# Patient Record
Sex: FEMALE | Race: ASIAN | NOT HISPANIC OR LATINO | URBAN - METROPOLITAN AREA
[De-identification: names, ages, dates, MRNs, and addresses within clinical notes are randomized per-mention and may not be internally consistent; named-entity substitution may affect disease eponyms.]

---

## 2019-07-19 ENCOUNTER — INPATIENT (INPATIENT)
Facility: HOSPITAL | Age: 64
LOS: 4 days | Discharge: HOME | End: 2019-07-24
Attending: HOSPITALIST | Admitting: HOSPITALIST
Payer: MEDICAID

## 2019-07-19 VITALS
DIASTOLIC BLOOD PRESSURE: 79 MMHG | OXYGEN SATURATION: 100 % | TEMPERATURE: 98 F | HEART RATE: 76 BPM | RESPIRATION RATE: 18 BRPM | SYSTOLIC BLOOD PRESSURE: 127 MMHG

## 2019-07-19 LAB
ALBUMIN SERPL ELPH-MCNC: 4.1 G/DL — SIGNIFICANT CHANGE UP (ref 3.3–5)
ALP SERPL-CCNC: 66 U/L — SIGNIFICANT CHANGE UP (ref 40–120)
ALT FLD-CCNC: 22 U/L — SIGNIFICANT CHANGE UP (ref 4–33)
ANION GAP SERPL CALC-SCNC: 14 MMO/L — SIGNIFICANT CHANGE UP (ref 7–14)
AST SERPL-CCNC: 40 U/L — HIGH (ref 4–32)
BASE EXCESS BLDV CALC-SCNC: -1.3 MMOL/L — SIGNIFICANT CHANGE UP
BASOPHILS # BLD AUTO: 0.03 K/UL — SIGNIFICANT CHANGE UP (ref 0–0.2)
BASOPHILS NFR BLD AUTO: 0.3 % — SIGNIFICANT CHANGE UP (ref 0–2)
BILIRUB SERPL-MCNC: 0.3 MG/DL — SIGNIFICANT CHANGE UP (ref 0.2–1.2)
BLOOD GAS VENOUS - CREATININE: 0.65 MG/DL — SIGNIFICANT CHANGE UP (ref 0.5–1.3)
BUN SERPL-MCNC: 6 MG/DL — LOW (ref 7–23)
CALCIUM SERPL-MCNC: 9.3 MG/DL — SIGNIFICANT CHANGE UP (ref 8.4–10.5)
CHLORIDE BLDV-SCNC: 100 MMOL/L — SIGNIFICANT CHANGE UP (ref 96–108)
CHLORIDE SERPL-SCNC: 94 MMOL/L — LOW (ref 98–107)
CO2 SERPL-SCNC: 19 MMOL/L — LOW (ref 22–31)
CREAT SERPL-MCNC: 0.56 MG/DL — SIGNIFICANT CHANGE UP (ref 0.5–1.3)
EOSINOPHIL # BLD AUTO: 0.06 K/UL — SIGNIFICANT CHANGE UP (ref 0–0.5)
EOSINOPHIL NFR BLD AUTO: 0.6 % — SIGNIFICANT CHANGE UP (ref 0–6)
GAS PNL BLDV: 121 MMOL/L — LOW (ref 136–146)
GLUCOSE BLDV-MCNC: 94 MG/DL — SIGNIFICANT CHANGE UP (ref 70–99)
GLUCOSE SERPL-MCNC: 93 MG/DL — SIGNIFICANT CHANGE UP (ref 70–99)
HCO3 BLDV-SCNC: 23 MMOL/L — SIGNIFICANT CHANGE UP (ref 20–27)
HCT VFR BLD CALC: 35.6 % — SIGNIFICANT CHANGE UP (ref 34.5–45)
HCT VFR BLDV CALC: 34.8 % — SIGNIFICANT CHANGE UP (ref 34.5–45)
HGB BLD-MCNC: 11.5 G/DL — SIGNIFICANT CHANGE UP (ref 11.5–15.5)
HGB BLDV-MCNC: 11.3 G/DL — LOW (ref 11.5–15.5)
IMM GRANULOCYTES NFR BLD AUTO: 0.5 % — SIGNIFICANT CHANGE UP (ref 0–1.5)
LACTATE BLDV-MCNC: 1.7 MMOL/L — SIGNIFICANT CHANGE UP (ref 0.5–2)
LYMPHOCYTES # BLD AUTO: 2.16 K/UL — SIGNIFICANT CHANGE UP (ref 1–3.3)
LYMPHOCYTES # BLD AUTO: 22 % — SIGNIFICANT CHANGE UP (ref 13–44)
MCHC RBC-ENTMCNC: 26.9 PG — LOW (ref 27–34)
MCHC RBC-ENTMCNC: 32.3 % — SIGNIFICANT CHANGE UP (ref 32–36)
MCV RBC AUTO: 83.4 FL — SIGNIFICANT CHANGE UP (ref 80–100)
MONOCYTES # BLD AUTO: 1.01 K/UL — HIGH (ref 0–0.9)
MONOCYTES NFR BLD AUTO: 10.3 % — SIGNIFICANT CHANGE UP (ref 2–14)
NEUTROPHILS # BLD AUTO: 6.5 K/UL — SIGNIFICANT CHANGE UP (ref 1.8–7.4)
NEUTROPHILS NFR BLD AUTO: 66.3 % — SIGNIFICANT CHANGE UP (ref 43–77)
NRBC # FLD: 0 K/UL — SIGNIFICANT CHANGE UP (ref 0–0)
PCO2 BLDV: 40 MMHG — LOW (ref 41–51)
PH BLDV: 7.38 PH — SIGNIFICANT CHANGE UP (ref 7.32–7.43)
PLATELET # BLD AUTO: 335 K/UL — SIGNIFICANT CHANGE UP (ref 150–400)
PMV BLD: 9.4 FL — SIGNIFICANT CHANGE UP (ref 7–13)
PO2 BLDV: 48 MMHG — HIGH (ref 35–40)
POTASSIUM BLDV-SCNC: 5.9 MMOL/L — HIGH (ref 3.4–4.5)
POTASSIUM SERPL-MCNC: 4.5 MMOL/L — SIGNIFICANT CHANGE UP (ref 3.5–5.3)
POTASSIUM SERPL-SCNC: 4.5 MMOL/L — SIGNIFICANT CHANGE UP (ref 3.5–5.3)
PROT SERPL-MCNC: 8.1 G/DL — SIGNIFICANT CHANGE UP (ref 6–8.3)
RBC # BLD: 4.27 M/UL — SIGNIFICANT CHANGE UP (ref 3.8–5.2)
RBC # FLD: 13.8 % — SIGNIFICANT CHANGE UP (ref 10.3–14.5)
SAO2 % BLDV: 79.5 % — SIGNIFICANT CHANGE UP (ref 60–85)
SODIUM SERPL-SCNC: 127 MMOL/L — LOW (ref 135–145)
TROPONIN T, HIGH SENSITIVITY: < 6 NG/L — SIGNIFICANT CHANGE UP (ref ?–14)
WBC # BLD: 9.81 K/UL — SIGNIFICANT CHANGE UP (ref 3.8–10.5)
WBC # FLD AUTO: 9.81 K/UL — SIGNIFICANT CHANGE UP (ref 3.8–10.5)

## 2019-07-19 PROCEDURE — 74177 CT ABD & PELVIS W/CONTRAST: CPT | Mod: 26

## 2019-07-19 PROCEDURE — 71046 X-RAY EXAM CHEST 2 VIEWS: CPT | Mod: 26

## 2019-07-19 PROCEDURE — 70450 CT HEAD/BRAIN W/O DYE: CPT | Mod: 26

## 2019-07-19 RX ORDER — MECLIZINE HCL 12.5 MG
25 TABLET ORAL ONCE
Refills: 0 | Status: COMPLETED | OUTPATIENT
Start: 2019-07-19 | End: 2019-07-19

## 2019-07-19 RX ORDER — MORPHINE SULFATE 50 MG/1
4 CAPSULE, EXTENDED RELEASE ORAL ONCE
Refills: 0 | Status: DISCONTINUED | OUTPATIENT
Start: 2019-07-19 | End: 2019-07-19

## 2019-07-19 RX ORDER — SODIUM CHLORIDE 9 MG/ML
1000 INJECTION INTRAMUSCULAR; INTRAVENOUS; SUBCUTANEOUS ONCE
Refills: 0 | Status: COMPLETED | OUTPATIENT
Start: 2019-07-19 | End: 2019-07-19

## 2019-07-19 RX ORDER — ONDANSETRON 8 MG/1
4 TABLET, FILM COATED ORAL ONCE
Refills: 0 | Status: COMPLETED | OUTPATIENT
Start: 2019-07-19 | End: 2019-07-19

## 2019-07-19 RX ADMIN — Medication 25 MILLIGRAM(S): at 20:39

## 2019-07-19 RX ADMIN — SODIUM CHLORIDE 1000 MILLILITER(S): 9 INJECTION INTRAMUSCULAR; INTRAVENOUS; SUBCUTANEOUS at 19:50

## 2019-07-19 NOTE — ED ADULT TRIAGE NOTE - CHIEF COMPLAINT QUOTE
Pt Vietnamese speaking,  ID# [id number].  Pt A+OX2.  Pt c/o abd bloating, gas, burning feeling, and discomfort.  Also c/o dizziness and always feeling cold.  Says she has a stomach ulcer.  Seen at [facility] for same 2 days ago.  Denies N/V/D or fever.  Not eating well due to gas.  Denies pain and feels like stomach is moving around.

## 2019-07-19 NOTE — ED ADULT NURSE NOTE - NSIMPLEMENTINTERV_GEN_ALL_ED
Implemented All Universal Safety Interventions:  [place] to call system. Call bell, personal items and telephone within reach. Instruct patient to call for assistance. Room bathroom lighting operational. Non-slip footwear when patient is off stretcher. Physically safe environment: no spills, clutter or unnecessary equipment. Stretcher in lowest position, wheels locked, appropriate side rails in place.

## 2019-07-19 NOTE — ED ADULT NURSE NOTE - OBJECTIVE STATEMENT
64 year old female, Indonesian speaking, daughter at bedside translating in addition to translation phone earlier. pt c/o abdominal discomfort x 1 month where pt feels bloated and "filled with gas", with gas pain radiating to chest. pt reports feeling cold, nauseas, dizzy, weak as well. pt reports constipation x 2 days. pt denies fevers, headache, numbness/tingling. pt reports being at [facility] for 2 days with no diagnosis. pt returned home and symptoms persisted. 20g iv insert to right ac. labs sent as ordered. NS infusing well.

## 2019-07-19 NOTE — ED PROVIDER NOTE - CARE PLAN
Principal Discharge DX:	Abdominal pain  Secondary Diagnosis:	Chest pain  Secondary Diagnosis:	Dizziness Principal Discharge DX:	Abdominal pain  Secondary Diagnosis:	Chest pain  Secondary Diagnosis:	Dizziness  Secondary Diagnosis:	Hyponatremia

## 2019-07-19 NOTE — ED PROVIDER NOTE - CLINICAL SUMMARY MEDICAL DECISION MAKING FREE TEXT BOX
63 Y/O Nepali speaking F ( [id number] used to speak to pt) PMH Hyponatremia, Peptic Ulcer, HTN, HLD presents with multiple complaints including CP, ABD PN and chronic Dizz. EKG normal. Plan is CT Head to R/O acute intracranial pathology and Cty Abdomen to R/O 65 Y/O Latvian speaking F ( [phone] used to speak to pt) PMH Hyponatremia, Peptic Ulcer, HTN, HLD presents with multiple complaints including CP, ABD PN and chronic Dizz. EKG normal. Plan is CT Head to R/O acute intracranial pathology and CT Abdomen to R/O obstruction or acute surgical pathology. Will X ray chest to R/O mass, cardiomegaly or focal consolidation. Morphine initially ordered but pt notes improvement of pain. EKG normal troponin sent. Labs ordered will eval for electrolyte disturbance.

## 2019-07-19 NOTE — ED PROVIDER NOTE - OBJECTIVE STATEMENT
65 Y/O Syriac speaking F ( [phone] used to speak to pt) PMH Hyponatremia, Peptic Ulcer, HTN, HLD presents with multiple complaints. Pt C/O ABD PN which feels like a burning/bloating and is very severe. 63 Y/O Divehi speaking F ( [phone] used to speak to pt) PMH Hyponatremia, Peptic Ulcer, HTN, HLD presents with multiple complaints. Pt C/O ABD PN which feels like a burning/bloating and is very severe. Pt admits to nausea, denies vomiting/diarrhea/fever/chills/nightsweats. Pt also states she feels a pressure in her chest. Pt states she is dizzy with a room spinning dizz that she has had for years. Pt was recently admitted at [facility] for hyponatremia and reports limited PO intake since discharged. Pt denies any other symptoms or complaints.

## 2019-07-19 NOTE — ED ADULT NURSE NOTE - CHIEF COMPLAINT QUOTE
Pt Setswana speaking,  ID# [id number].  Pt A+OX2.  Pt c/o abd bloating, gas, burning feeling, and discomfort.  Also c/o dizziness and always feeling cold.  Says she has a stomach ulcer.  Seen at [facility] for same 2 days ago.  Denies N/V/D or fever.  Not eating well due to gas.  Denies pain and feels like stomach is moving around.

## 2019-07-19 NOTE — ED PROVIDER NOTE - ATTENDING CONTRIBUTION TO CARE
Rosalie: I have seen and examined the patient face to face, have reviewed and addended the HPI, PE and a/p as necessary.    65 yo F English speaking with recent hyponatremia, peptic ulcer, HTN and HLD a/w abdominal pain burning severe in quality for the past few days and dizziness for years.  Dizziness described as vertigo for years.  According to daughter she has also had chest pressure in chest intermittent, last episode was today.  She is unable to define chest pressure.  Was recently in [facility] for hypoatremia, and since discharge has been having decreased PO intake and ?weight loss according to daughter.  No fever/chills, No photophobia/eye pain/changes in vision, No ear pain/sore throat/dysphagia, No chest pain/palpitations, no SOB/cough/wheeze/stridor, No abdominal pain, No N/V/D, no dysuria/frequency/discharge, No neck/back pain, no rash, no changes in neurological status/function.      GEN - NAD; well appearing; A+O x3; non-toxic appearing  MOUTH - Dry mucous membranes.    CARD -s1s2, RRR, no M,G,R;   PULM - CTA b/l, symmetric breath sounds;   ABD:  +BS, soft, diffusely tender to palpation, no guarding, no rebound, no masses;   BACK: no CVA tenderness, Normal  spine;   EXT: symmetric pulses, 2+ dp, capillary refill < 2 no clubbing, no cyanosis, no edema    EKG NSR 75 BPM, no st/t wave changes     65 yo F a/w dizziness, chest pain and recently hyponatremic, concern for dehydration and possible atypical chest pain.  Will check trops, cbc cmp, chest xray and ct abd pelvis to evaluate for chest pain acs and intraabdominal pathology.

## 2019-07-20 DIAGNOSIS — R10.9 UNSPECIFIED ABDOMINAL PAIN: ICD-10-CM

## 2019-07-20 DIAGNOSIS — E78.5 HYPERLIPIDEMIA, UNSPECIFIED: ICD-10-CM

## 2019-07-20 DIAGNOSIS — Z29.9 ENCOUNTER FOR PROPHYLACTIC MEASURES, UNSPECIFIED: ICD-10-CM

## 2019-07-20 DIAGNOSIS — R42 DIZZINESS AND GIDDINESS: ICD-10-CM

## 2019-07-20 DIAGNOSIS — R07.89 OTHER CHEST PAIN: ICD-10-CM

## 2019-07-20 DIAGNOSIS — D64.9 ANEMIA, UNSPECIFIED: ICD-10-CM

## 2019-07-20 DIAGNOSIS — I10 ESSENTIAL (PRIMARY) HYPERTENSION: ICD-10-CM

## 2019-07-20 DIAGNOSIS — E87.1 HYPO-OSMOLALITY AND HYPONATREMIA: ICD-10-CM

## 2019-07-20 DIAGNOSIS — E87.6 HYPOKALEMIA: ICD-10-CM

## 2019-07-20 DIAGNOSIS — R07.9 CHEST PAIN, UNSPECIFIED: ICD-10-CM

## 2019-07-20 LAB
ALBUMIN SERPL ELPH-MCNC: 3.8 G/DL — SIGNIFICANT CHANGE UP (ref 3.3–5)
ALP SERPL-CCNC: 67 U/L — SIGNIFICANT CHANGE UP (ref 40–120)
ALT FLD-CCNC: 18 U/L — SIGNIFICANT CHANGE UP (ref 4–33)
ANION GAP SERPL CALC-SCNC: 13 MMO/L — SIGNIFICANT CHANGE UP (ref 7–14)
APPEARANCE UR: CLEAR — SIGNIFICANT CHANGE UP
AST SERPL-CCNC: 17 U/L — SIGNIFICANT CHANGE UP (ref 4–32)
BASOPHILS # BLD AUTO: 0.03 K/UL — SIGNIFICANT CHANGE UP (ref 0–0.2)
BASOPHILS # BLD AUTO: 0.04 K/UL — SIGNIFICANT CHANGE UP (ref 0–0.2)
BASOPHILS NFR BLD AUTO: 0.3 % — SIGNIFICANT CHANGE UP (ref 0–2)
BASOPHILS NFR BLD AUTO: 0.5 % — SIGNIFICANT CHANGE UP (ref 0–2)
BILIRUB SERPL-MCNC: 0.4 MG/DL — SIGNIFICANT CHANGE UP (ref 0.2–1.2)
BILIRUB UR-MCNC: NEGATIVE — SIGNIFICANT CHANGE UP
BLOOD UR QL VISUAL: NEGATIVE — SIGNIFICANT CHANGE UP
BUN SERPL-MCNC: 6 MG/DL — LOW (ref 7–23)
CALCIUM SERPL-MCNC: 9.2 MG/DL — SIGNIFICANT CHANGE UP (ref 8.4–10.5)
CHLORIDE SERPL-SCNC: 98 MMOL/L — SIGNIFICANT CHANGE UP (ref 98–107)
CHLORIDE UR-SCNC: 83 MMOL/L — SIGNIFICANT CHANGE UP
CHOLEST SERPL-MCNC: 167 MG/DL — SIGNIFICANT CHANGE UP (ref 120–199)
CK MB BLD-MCNC: 2.65 NG/ML — SIGNIFICANT CHANGE UP (ref 1–4.7)
CK SERPL-CCNC: 50 U/L — SIGNIFICANT CHANGE UP (ref 25–170)
CO2 SERPL-SCNC: 19 MMOL/L — LOW (ref 22–31)
COLOR SPEC: COLORLESS — SIGNIFICANT CHANGE UP
CREAT ?TM UR-MCNC: 16.8 MG/DL — SIGNIFICANT CHANGE UP
CREAT SERPL-MCNC: 0.65 MG/DL — SIGNIFICANT CHANGE UP (ref 0.5–1.3)
EOSINOPHIL # BLD AUTO: 0.03 K/UL — SIGNIFICANT CHANGE UP (ref 0–0.5)
EOSINOPHIL # BLD AUTO: 0.09 K/UL — SIGNIFICANT CHANGE UP (ref 0–0.5)
EOSINOPHIL NFR BLD AUTO: 0.3 % — SIGNIFICANT CHANGE UP (ref 0–6)
EOSINOPHIL NFR BLD AUTO: 1 % — SIGNIFICANT CHANGE UP (ref 0–6)
GLUCOSE SERPL-MCNC: 98 MG/DL — SIGNIFICANT CHANGE UP (ref 70–99)
GLUCOSE UR-MCNC: NEGATIVE — SIGNIFICANT CHANGE UP
HBA1C BLD-MCNC: 5.7 % — HIGH (ref 4–5.6)
HCT VFR BLD CALC: 33.3 % — LOW (ref 34.5–45)
HCT VFR BLD CALC: 33.7 % — LOW (ref 34.5–45)
HDLC SERPL-MCNC: 71 MG/DL — HIGH (ref 45–65)
HGB BLD-MCNC: 10.7 G/DL — LOW (ref 11.5–15.5)
HGB BLD-MCNC: 11.1 G/DL — LOW (ref 11.5–15.5)
IMM GRANULOCYTES NFR BLD AUTO: 0.3 % — SIGNIFICANT CHANGE UP (ref 0–1.5)
IMM GRANULOCYTES NFR BLD AUTO: 0.5 % — SIGNIFICANT CHANGE UP (ref 0–1.5)
KETONES UR-MCNC: NEGATIVE — SIGNIFICANT CHANGE UP
LEUKOCYTE ESTERASE UR-ACNC: NEGATIVE — SIGNIFICANT CHANGE UP
LIPID PNL WITH DIRECT LDL SERPL: 77 MG/DL — SIGNIFICANT CHANGE UP
LYMPHOCYTES # BLD AUTO: 1.7 K/UL — SIGNIFICANT CHANGE UP (ref 1–3.3)
LYMPHOCYTES # BLD AUTO: 19.8 % — SIGNIFICANT CHANGE UP (ref 13–44)
LYMPHOCYTES # BLD AUTO: 2.73 K/UL — SIGNIFICANT CHANGE UP (ref 1–3.3)
LYMPHOCYTES # BLD AUTO: 28.9 % — SIGNIFICANT CHANGE UP (ref 13–44)
MAGNESIUM SERPL-MCNC: 1.9 MG/DL — SIGNIFICANT CHANGE UP (ref 1.6–2.6)
MCHC RBC-ENTMCNC: 26.4 PG — LOW (ref 27–34)
MCHC RBC-ENTMCNC: 27 PG — SIGNIFICANT CHANGE UP (ref 27–34)
MCHC RBC-ENTMCNC: 32.1 % — SIGNIFICANT CHANGE UP (ref 32–36)
MCHC RBC-ENTMCNC: 32.9 % — SIGNIFICANT CHANGE UP (ref 32–36)
MCV RBC AUTO: 82 FL — SIGNIFICANT CHANGE UP (ref 80–100)
MCV RBC AUTO: 82 FL — SIGNIFICANT CHANGE UP (ref 80–100)
MONOCYTES # BLD AUTO: 0.84 K/UL — SIGNIFICANT CHANGE UP (ref 0–0.9)
MONOCYTES # BLD AUTO: 0.87 K/UL — SIGNIFICANT CHANGE UP (ref 0–0.9)
MONOCYTES NFR BLD AUTO: 10.1 % — SIGNIFICANT CHANGE UP (ref 2–14)
MONOCYTES NFR BLD AUTO: 8.9 % — SIGNIFICANT CHANGE UP (ref 2–14)
NEUTROPHILS # BLD AUTO: 5.73 K/UL — SIGNIFICANT CHANGE UP (ref 1.8–7.4)
NEUTROPHILS # BLD AUTO: 5.91 K/UL — SIGNIFICANT CHANGE UP (ref 1.8–7.4)
NEUTROPHILS NFR BLD AUTO: 60.6 % — SIGNIFICANT CHANGE UP (ref 43–77)
NEUTROPHILS NFR BLD AUTO: 68.8 % — SIGNIFICANT CHANGE UP (ref 43–77)
NITRITE UR-MCNC: NEGATIVE — SIGNIFICANT CHANGE UP
NRBC # FLD: 0 K/UL — SIGNIFICANT CHANGE UP (ref 0–0)
NRBC # FLD: 0 K/UL — SIGNIFICANT CHANGE UP (ref 0–0)
PH UR: 7.5 — SIGNIFICANT CHANGE UP (ref 5–8)
PHOSPHATE SERPL-MCNC: 4 MG/DL — SIGNIFICANT CHANGE UP (ref 2.5–4.5)
PLATELET # BLD AUTO: 301 K/UL — SIGNIFICANT CHANGE UP (ref 150–400)
PLATELET # BLD AUTO: 307 K/UL — SIGNIFICANT CHANGE UP (ref 150–400)
PMV BLD: 8.8 FL — SIGNIFICANT CHANGE UP (ref 7–13)
PMV BLD: 9.1 FL — SIGNIFICANT CHANGE UP (ref 7–13)
POTASSIUM SERPL-MCNC: 3.4 MMOL/L — LOW (ref 3.5–5.3)
POTASSIUM SERPL-SCNC: 3.4 MMOL/L — LOW (ref 3.5–5.3)
POTASSIUM UR-SCNC: 10.1 MMOL/L — SIGNIFICANT CHANGE UP
PROT SERPL-MCNC: 7.4 G/DL — SIGNIFICANT CHANGE UP (ref 6–8.3)
PROT UR-MCNC: NEGATIVE — SIGNIFICANT CHANGE UP
RBC # BLD: 4.06 M/UL — SIGNIFICANT CHANGE UP (ref 3.8–5.2)
RBC # BLD: 4.11 M/UL — SIGNIFICANT CHANGE UP (ref 3.8–5.2)
RBC # FLD: 13.8 % — SIGNIFICANT CHANGE UP (ref 10.3–14.5)
RBC # FLD: 13.8 % — SIGNIFICANT CHANGE UP (ref 10.3–14.5)
SODIUM SERPL-SCNC: 130 MMOL/L — LOW (ref 135–145)
SODIUM UR-SCNC: 89 MMOL/L — SIGNIFICANT CHANGE UP
SP GR SPEC: 1.04 — SIGNIFICANT CHANGE UP (ref 1–1.04)
TRIGL SERPL-MCNC: 167 MG/DL — HIGH (ref 10–149)
TROPONIN T, HIGH SENSITIVITY: < 6 NG/L — SIGNIFICANT CHANGE UP (ref ?–14)
TSH SERPL-MCNC: 2 UIU/ML — SIGNIFICANT CHANGE UP (ref 0.27–4.2)
UROBILINOGEN FLD QL: NORMAL — SIGNIFICANT CHANGE UP
WBC # BLD: 8.59 K/UL — SIGNIFICANT CHANGE UP (ref 3.8–10.5)
WBC # BLD: 9.45 K/UL — SIGNIFICANT CHANGE UP (ref 3.8–10.5)
WBC # FLD AUTO: 8.59 K/UL — SIGNIFICANT CHANGE UP (ref 3.8–10.5)
WBC # FLD AUTO: 9.45 K/UL — SIGNIFICANT CHANGE UP (ref 3.8–10.5)

## 2019-07-20 PROCEDURE — 99222 1ST HOSP IP/OBS MODERATE 55: CPT

## 2019-07-20 PROCEDURE — 93306 TTE W/DOPPLER COMPLETE: CPT | Mod: 26

## 2019-07-20 PROCEDURE — 99223 1ST HOSP IP/OBS HIGH 75: CPT

## 2019-07-20 PROCEDURE — 12345: CPT | Mod: NC

## 2019-07-20 PROCEDURE — 93010 ELECTROCARDIOGRAM REPORT: CPT

## 2019-07-20 RX ORDER — DOCUSATE SODIUM 100 MG
100 CAPSULE ORAL THREE TIMES A DAY
Refills: 0 | Status: DISCONTINUED | OUTPATIENT
Start: 2019-07-20 | End: 2019-07-24

## 2019-07-20 RX ORDER — POLYETHYLENE GLYCOL 3350 17 G/17G
17 POWDER, FOR SOLUTION ORAL DAILY
Refills: 0 | Status: DISCONTINUED | OUTPATIENT
Start: 2019-07-20 | End: 2019-07-24

## 2019-07-20 RX ORDER — ONDANSETRON 8 MG/1
4 TABLET, FILM COATED ORAL EVERY 6 HOURS
Refills: 0 | Status: DISCONTINUED | OUTPATIENT
Start: 2019-07-20 | End: 2019-07-24

## 2019-07-20 RX ORDER — PANTOPRAZOLE SODIUM 20 MG/1
40 TABLET, DELAYED RELEASE ORAL DAILY
Refills: 0 | Status: DISCONTINUED | OUTPATIENT
Start: 2019-07-20 | End: 2019-07-24

## 2019-07-20 RX ORDER — ASPIRIN/CALCIUM CARB/MAGNESIUM 324 MG
81 TABLET ORAL DAILY
Refills: 0 | Status: DISCONTINUED | OUTPATIENT
Start: 2019-07-20 | End: 2019-07-24

## 2019-07-20 RX ORDER — FOLIC ACID 0.8 MG
1 TABLET ORAL DAILY
Refills: 0 | Status: DISCONTINUED | OUTPATIENT
Start: 2019-07-20 | End: 2019-07-24

## 2019-07-20 RX ORDER — NEBIVOLOL HYDROCHLORIDE 5 MG/1
2.5 TABLET ORAL DAILY
Refills: 0 | Status: DISCONTINUED | OUTPATIENT
Start: 2019-07-20 | End: 2019-07-24

## 2019-07-20 RX ORDER — HEPARIN SODIUM 5000 [USP'U]/ML
5000 INJECTION INTRAVENOUS; SUBCUTANEOUS EVERY 12 HOURS
Refills: 0 | Status: DISCONTINUED | OUTPATIENT
Start: 2019-07-20 | End: 2019-07-24

## 2019-07-20 RX ORDER — FENOFIBRATE,MICRONIZED 130 MG
145 CAPSULE ORAL DAILY
Refills: 0 | Status: DISCONTINUED | OUTPATIENT
Start: 2019-07-20 | End: 2019-07-24

## 2019-07-20 RX ORDER — HYOSCYAMINE SULFATE 0.13 MG
0.12 TABLET ORAL EVERY 4 HOURS
Refills: 0 | Status: DISCONTINUED | OUTPATIENT
Start: 2019-07-20 | End: 2019-07-24

## 2019-07-20 RX ORDER — POTASSIUM CHLORIDE 20 MEQ
40 PACKET (EA) ORAL ONCE
Refills: 0 | Status: COMPLETED | OUTPATIENT
Start: 2019-07-20 | End: 2019-07-20

## 2019-07-20 RX ORDER — SODIUM CHLORIDE 9 MG/ML
3 INJECTION INTRAMUSCULAR; INTRAVENOUS; SUBCUTANEOUS EVERY 8 HOURS
Refills: 0 | Status: DISCONTINUED | OUTPATIENT
Start: 2019-07-20 | End: 2019-07-24

## 2019-07-20 RX ORDER — SENNA PLUS 8.6 MG/1
2 TABLET ORAL AT BEDTIME
Refills: 0 | Status: DISCONTINUED | OUTPATIENT
Start: 2019-07-20 | End: 2019-07-24

## 2019-07-20 RX ORDER — ERGOCALCIFEROL 1.25 MG/1
1 CAPSULE ORAL
Qty: 0 | Refills: 0 | DISCHARGE

## 2019-07-20 RX ORDER — MECLIZINE HCL 12.5 MG
25 TABLET ORAL THREE TIMES A DAY
Refills: 0 | Status: DISCONTINUED | OUTPATIENT
Start: 2019-07-20 | End: 2019-07-24

## 2019-07-20 RX ORDER — ZOLPIDEM TARTRATE 10 MG/1
5 TABLET ORAL AT BEDTIME
Refills: 0 | Status: DISCONTINUED | OUTPATIENT
Start: 2019-07-20 | End: 2019-07-24

## 2019-07-20 RX ORDER — FAMOTIDINE 10 MG/ML
20 INJECTION INTRAVENOUS ONCE
Refills: 0 | Status: COMPLETED | OUTPATIENT
Start: 2019-07-20 | End: 2019-07-20

## 2019-07-20 RX ADMIN — ONDANSETRON 4 MILLIGRAM(S): 8 TABLET, FILM COATED ORAL at 13:54

## 2019-07-20 RX ADMIN — SODIUM CHLORIDE 3 MILLILITER(S): 9 INJECTION INTRAMUSCULAR; INTRAVENOUS; SUBCUTANEOUS at 21:38

## 2019-07-20 RX ADMIN — Medication 1 MILLIGRAM(S): at 12:09

## 2019-07-20 RX ADMIN — Medication 145 MILLIGRAM(S): at 12:09

## 2019-07-20 RX ADMIN — FAMOTIDINE 20 MILLIGRAM(S): 10 INJECTION INTRAVENOUS at 22:30

## 2019-07-20 RX ADMIN — PANTOPRAZOLE SODIUM 40 MILLIGRAM(S): 20 TABLET, DELAYED RELEASE ORAL at 07:06

## 2019-07-20 RX ADMIN — Medication 81 MILLIGRAM(S): at 12:09

## 2019-07-20 RX ADMIN — Medication 40 MILLIEQUIVALENT(S): at 10:26

## 2019-07-20 RX ADMIN — SODIUM CHLORIDE 3 MILLILITER(S): 9 INJECTION INTRAMUSCULAR; INTRAVENOUS; SUBCUTANEOUS at 13:54

## 2019-07-20 RX ADMIN — HEPARIN SODIUM 5000 UNIT(S): 5000 INJECTION INTRAVENOUS; SUBCUTANEOUS at 07:05

## 2019-07-20 RX ADMIN — Medication 100 MILLIGRAM(S): at 13:39

## 2019-07-20 RX ADMIN — NEBIVOLOL HYDROCHLORIDE 2.5 MILLIGRAM(S): 5 TABLET ORAL at 10:27

## 2019-07-20 RX ADMIN — SODIUM CHLORIDE 3 MILLILITER(S): 9 INJECTION INTRAMUSCULAR; INTRAVENOUS; SUBCUTANEOUS at 07:09

## 2019-07-20 RX ADMIN — SENNA PLUS 2 TABLET(S): 8.6 TABLET ORAL at 21:38

## 2019-07-20 RX ADMIN — Medication 100 MILLIGRAM(S): at 21:38

## 2019-07-20 RX ADMIN — HEPARIN SODIUM 5000 UNIT(S): 5000 INJECTION INTRAVENOUS; SUBCUTANEOUS at 18:16

## 2019-07-20 RX ADMIN — Medication 25 MILLIGRAM(S): at 22:35

## 2019-07-20 NOTE — CONSULT NOTE ADULT - ATTENDING COMMENTS
63 yo F HTN HL Hyponatremia presenting for second admit in 2 weeks for abdominal pain.  Negative labs and imaging.  has not had endoscopy.  Given post prandial epigastric discomfort with associated bloating, would proceed with endoscopy as next step.    Impression:  1) Post prandial abdominal pain  2) HTN/HL  3) Hyponatremia    Plan:  1) EGD on monday  2) PO PPI daily  3) Gas X PRN  4) Hyponatremia workup as per primary team  5) GI to follow

## 2019-07-20 NOTE — H&P ADULT - ASSESSMENT
63 y/o F with hx of HTN, HLD, hyponatremia presents with abdominal discomfort which describes as burning/bloating sensation with associated nausea x 1 week.

## 2019-07-20 NOTE — H&P ADULT - NSHPLABSRESULTS_GEN_ALL_CORE
EKG: NSR 73 TWI in AVR Flat T  in V1                          10.7   9.45  )-----------( 307      ( 20 Jul 2019 02:52 )             33.3     07-20    130<L>  |  98  |  6<L>  ----------------------------<  98  3.4<L>   |  19<L>  |  0.65    Ca    9.2      20 Jul 2019 02:52  Phos  4.0     07-20  Mg     1.9     07-20    TPro  7.4  /  Alb  3.8  /  TBili  0.4  /  DBili  x   /  AST  17  /  ALT  18  /  AlkPhos  67  07-20    Troponin T, High Sensitivity: < 6: ---------------------***PLEASE NOTE***----------------------  Rapid changes upward or downward in high-sensitivity  troponin levels strongly suggest acute myocardial injury.  Hemolysis may falsely lower results. Renal impairment may  increase results.    Normal: <6 - 14 ng/L  Indeterminate: 15 - 51 ng/L  Elevated: >51 ng/L    Please see "[email or web address]" on the [employer] for more information. ng/L (07.20.19 @ 02:52) EKG, 7/19, NSR 75bpm, qtc 439, no acute Tw or ST changes - my reading                           10.7   9.45  )-----------( 307      ( 20 Jul 2019 02:52 )             33.3     07-20    130<L>  |  98  |  6<L>  ----------------------------<  98  3.4<L>   |  19<L>  |  0.65    Ca    9.2      20 Jul 2019 02:52  Phos  4.0     07-20  Mg     1.9     07-20    TPro  7.4  /  Alb  3.8  /  TBili  0.4  /  DBili  x   /  AST  17  /  ALT  18  /  AlkPhos  67  07-20    Troponin T, High Sensitivity: < 6: ---------------------***PLEASE NOTE***----------------------  Rapid changes upward or downward in high-sensitivity  troponin levels strongly suggest acute myocardial injury.  Hemolysis may falsely lower results. Renal impairment may  increase results.    Normal: <6 - 14 ng/L  Indeterminate: 15 - 51 ng/L  Elevated: >51 ng/L    Please see "[email or web address] on the [employer]  intranet for more information. ng/L (07.20.19 @ 02:52) EKG, 7/19, NSR 75bpm, qtc 439, no acute Tw or ST changes - my reading                           10.7   9.45  )-----------( 307      ( 20 Jul 2019 02:52 )             33.3     07-20    130<L>  |  98  |  6<L>  ----------------------------<  98  3.4<L>   |  19<L>  |  0.65    Ca    9.2      20 Jul 2019 02:52  Phos  4.0     07-20  Mg     1.9     07-20    TPro  7.4  /  Alb  3.8  /  TBili  0.4  /  DBili  x   /  AST  17  /  ALT  18  /  AlkPhos  67  07-20      EXAM:  CT BRAIN    PROCEDURE DATE:  Jul 19 2019   COMPARISON: No prior  FINDINGS:  The CT examination demonstrates mild generalized volume loss as   manifested by the enlargement of the ventricles, cisternal spaces, and   cortical sulci throughout.   There is no acute intracranial hemorrhage, mass effect, midline shift or   extra axial collections.   The gray white differentiation appears grossly preserved without evidence   for an acute transcortical infarction.   The bony windows demonstrates no fractures.   The included paranasal sinuses and mastoid air cells are predominantly   clear.  IMPRESSION:   There is no acute intracranial hemorrhage, mass effect, midline shift or   extra axial collections.       CT ABDOMEN AND PELVIS IC    PROCEDURE DATE:  Jul 19 2019   FINDINGS:  LOWER CHEST: Within normal limits.  LIVER: Hepatic steatosis.  BILE DUCTS: Normal caliber.  GALLBLADDER: Within normal limits.  SPLEEN: Within normal limits.  PANCREAS: Within normal limits.  ADRENALS: Within normal limits.  KIDNEYS/URETERS: Symmetric enhancement. No hydronephrosis.   BLADDER: Within normal limits.  REPRODUCTIVE ORGANS: Uterus is within normal limits. There is a 1.5 x 1.1   cm right adnexal cyst.  BOWEL: No bowel obstruction. Appendix is normal.  PERITONEUM: No ascites.  VESSELS:  Within normal limits.  RETROPERITONEUM: No lymphadenopathy.    ABDOMINAL WALL: Small fat-containing umbilical hernia.  BONES: No acute osseous abnormality.  IMPRESSION:   The reason for the patient's abdominal pain is not elucidated on this   examination.

## 2019-07-20 NOTE — H&P ADULT - PROBLEM SELECTOR PLAN 1
Admit to tele  check cbc, bmp, a1c, flp, tsh, trend CE  Echo ordered   consider cards eval Admit to tele  check cbc, bmp, a1c, flp, tsh, trend CE  Echo ordered   consider cards eval  Discussed with Dr. Hooper Ddx includes peptic ulcer disease, H pylori infection, gastritis   CT abdomen: The reason for the patient's abdominal pain is not elucidated on this   examination.  GI consult in AM for possible EGD  H Pylori antigen and Ab studies  Empiric Protnix IV and Zofran PRN for nausea Ddx includes peptic ulcer disease, H pylori infection, gastritis   CT abdomen: not contributory   GI consult in AM for possible EGD  H Pylori antigen and Ab studies  Empiric Protnix IV and Zofran PRN for nausea

## 2019-07-20 NOTE — CONSULT NOTE ADULT - SUBJECTIVE AND OBJECTIVE BOX
Chief Complaint:  Patient is a 64y old  Female who presents with a chief complaint of Chest pressure (2019 11:21)      HPI:  63yo F history of HTN, HLD presents with abdominal bloating and excessive gaseous distention. Pt states that this has been happening for the past 6-7 years, worsened recently when her medications was switched (pt unable to tell examiner which medications were given). Pt reportedly had an EGD in [facility] that was negative a few years ago, and came to the [facility] to address this problem. Pt also endorses chest pressure, but denies nausea, vomiting, dysphagia, odynophagia, fever, chills, weight loss, dyspepsia, melena, change in bowel habits, constipation, diarrhea. Abdominal discomfort does not radiate elsewhere. Pt's last colonoscopy was 12 years ago, results unknown. Pt currently takes Ranitidine and antacid with no improvement in her symptoms. Pt currently hesitant to undergo endoscopic evaluation. Of note, patient presented [facility] x 1 weeks for similar complaints, but was discharged home.    CT here showed hepatic steatosis, but was otherwise wnl.       Allergies:  No Known Allergies      Home Medications:    Hospital Medications:  aspirin enteric coated 81 milliGRAM(s) Oral daily  docusate sodium 100 milliGRAM(s) Oral three times a day  fenofibrate Tablet 145 milliGRAM(s) Oral daily  folic acid 1 milliGRAM(s) Oral daily  heparin  Injectable 5000 Unit(s) SubCutaneous every 12 hours  meclizine 25 milliGRAM(s) Oral three times a day PRN  nebivolol 2.5 milliGRAM(s) Oral daily  ondansetron Injectable 4 milliGRAM(s) IV Push every 6 hours PRN  pantoprazole  Injectable 40 milliGRAM(s) IV Push daily  polyethylene glycol 3350 17 Gram(s) Oral daily  senna 2 Tablet(s) Oral at bedtime  sodium chloride 0.9% lock flush 3 milliLiter(s) IV Push every 8 hours  zolpidem 5 milliGRAM(s) Oral at bedtime PRN      PMHX/PSHX:  Hyponatremia  Hyperlipemia  Hypertension  No significant past surgical history      Family history:  No pertinent family history in first degree relatives      There is no family history of peptic ulcer disease, gastric cancer, colon polyps, colon cancer, celiac disease, biliary, hepatic, or pancreatic disease.  None of the female relatives have breast, uterine, or ovarian cancer.     Social History:     ROS:     General:  No wt loss, fevers, chills, night sweats, fatigue,   Eyes:  Good vision, no reported pain  ENT:  No sore throat, pain, runny nose, dysphagia  CV:  No pain, palpitations, hypo/hypertension  Resp:  No dyspnea, cough, tachypnea, wheezing  GI:  see HPI  :  No pain, bleeding, incontinence, nocturia  Muscle:  No pain, weakness  Neuro:  No weakness, tingling, memory problems  Psych:  No fatigue, insomnia, mood problems, depression  Endocrine:  No polyuria, polydipsia, cold/heat intolerance  Heme:  No petechiae, ecchymosis, easy bruisability  Skin:  No rash, tattoos, scars, edema      PHYSICAL EXAM:     GENERAL:  Appears stated age, well-groomed  HEENT:  NC/AT,  conjunctivae clear and pink  CHEST:  Full & symmetric excursion, no increased effort, breath sounds clear  HEART:  Regular rhythm, S1, S2  ABDOMEN:  Soft, non-tender, non-distended  EXTEREMITIES:  no cyanosis,clubbing or edema  SKIN:  No rash/erythema/ecchymoses  NEURO:  Alert, oriented, no asterixis    Vital Signs:  Vital Signs Last 24 Hrs  T(C): 36.8 (2019 10:25), Max: 36.9 (2019 01:15)  T(F): 98.2 (2019 10:25), Max: 98.4 (2019 01:15)  HR: 89 (2019 10:25) (75 - 89)  BP: 132/82 (2019 10:25) (127/79 - 133/86)  BP(mean): --  RR: 16 (2019 10:25) (16 - 18)  SpO2: 100% (2019 10:25) (100% - 100%)  Daily Height in cm: 152.4 (2019 01:55)    Daily     LABS:                        10.7   9.45  )-----------( 307      ( 2019 02:52 )             33.3     Mean Cell Volume: 82.0 fL (-19 @ 02:52)        130<L>  |  98  |  6<L>  ----------------------------<  98  3.4<L>   |  19<L>  |  0.65    Ca    9.2      2019 02:52  Phos  4.0       Mg     1.9         TPro  7.4  /  Alb  3.8  /  TBili  0.4  /  DBili  x   /  AST  17  /  ALT  18  /  AlkPhos  67  07-20    LIVER FUNCTIONS - ( 2019 02:52 )  Alb: 3.8 g/dL / Pro: 7.4 g/dL / ALK PHOS: 67 u/L / ALT: 18 u/L / AST: 17 u/L / GGT: x             Urinalysis Basic - ( 2019 02:37 )    Color: COLORLESS / Appearance: CLEAR / S.037 / pH: 7.5  Gluc: NEGATIVE / Ketone: NEGATIVE  / Bili: NEGATIVE / Urobili: NORMAL   Blood: NEGATIVE / Protein: NEGATIVE / Nitrite: NEGATIVE   Leuk Esterase: NEGATIVE / RBC: x / WBC x   Sq Epi: x / Non Sq Epi: x / Bacteria: x                              10.7   9.45  )-----------( 307      ( 2019 02:52 )             33.3                         11.5   9.81  )-----------( 335      ( 2019 19:50 )             35.6     Imaging:

## 2019-07-20 NOTE — H&P ADULT - PROBLEM SELECTOR PLAN 3
Monitor  patient with hx of hyponatremia Mild, asymptomatic; Likely due to SIADH due to prolonged abd discomfort with severe nausea; No evidence of dehydration on BUN: Scr ratio;   Protonix IV   Zofran IV  Monitor serum sodium and response to NS IV

## 2019-07-20 NOTE — PHYSICAL THERAPY INITIAL EVALUATION ADULT - GENERAL OBSERVATIONS, REHAB EVAL
Patient was received semi-supine in bed in NAD. [place]  Trevor #[id number] utilized for translation.

## 2019-07-20 NOTE — PROVIDER CONTACT NOTE (OTHER) - ASSESSMENT
pt denies pain but stated pressure present and pointed to sternum with radiation to L shoulder. pt also complaining of epigastric discomfort. abd rounded, nontender. VSS pt denies pain but stated pressure present and pointed to sternum with radiation to L shoulder. pt also complaining of epigastric discomfort. abd rounded, nontender. VSS.  Nilam [id number]

## 2019-07-20 NOTE — PHYSICAL THERAPY INITIAL EVALUATION ADULT - PERTINENT HX OF CURRENT PROBLEM, REHAB EVAL
Patient is a 64 year old female admitted to [facility] on 7/20 with abdominal discomfort which describes as burning/bloating sensation with associated nausea and no vomiting x 1 week. PMH: HTN, HLD, hyponatremia.

## 2019-07-20 NOTE — H&P ADULT - NSHPSOCIALHISTORY_GEN_ALL_CORE
Denies smoking, alcohol or drug use    Lives with grandchildren Denies smoking, alcohol or drug use  Lives with grandchildren

## 2019-07-20 NOTE — PHYSICAL THERAPY INITIAL EVALUATION ADULT - ADDITIONAL COMMENTS
Patient reports she lives with her daughter and family in an apartment, denies steps to negotiate. Patient reports "they do everything for me" regarding ADLs, and reports she ambulated independently prior to admission.     Patient was left semi-supine in bed as found, all lines/tubes intact and call terrell within reach, JENNIFER baker

## 2019-07-20 NOTE — H&P ADULT - PROBLEM SELECTOR PLAN 2
CT abdomen: The reason for the patient's abdominal pain is not elucidated on this   examination.  start protonix and maalox PRN CT abdomen: The reason for the patient's abdominal pain is not elucidated on this   examination.  GI consult in AM  HPylori antigen studies GI etiology such as esophagitis vs cardiac;  Admit to tele  check cbc, bmp, a1c, flp, tsh, trend CE  Echo ordered   Screening EKG - no acute finding  hsTrop < 6 x2, CK and CKMB wnl  No evidence of ACS  Consider stress test as outpt Due to: GI etiology such as esophagitis vs less likely cardiac;  Admit to tele  check cbc, bmp, a1c, flp, tsh, trend CE  Echo ordered   Screening EKG - no acute finding  hsTrop < 6 x2, CK and CKMB wnl  No evidence of ACS  Consider stress test as outpt

## 2019-07-20 NOTE — H&P ADULT - PROBLEM SELECTOR PLAN 6
cont bystolic Check orthostatics  Fall risk  echo ordered  cont meclizine CT head not contributory: There is no acute intracranial hemorrhage, mass effect, midline shift or extra axial collections.   Check orthostatics x3 checks  Fall risk  echo ordered  cont meclizine  Telemetry CT head not contributory: There is no acute intracranial hemorrhage, mass effect, midline shift or extra axial collections.   Check orthostatics x3 checks  Fall risk  echo ordered  cont meclizine  Telemetry  PT eval to assess gait stability, fall precautions for now

## 2019-07-20 NOTE — H&P ADULT - NSHPLANGTRANSLATORFT_GEN_A_CORE
Patient speaks a Kazakh dialect; [place]elt[place]. Patient's son in law at bedside also aided with .  ID#[id number]

## 2019-07-20 NOTE — H&P ADULT - NEUROLOGICAL DETAILS
responds to verbal commands/cranial nerves intact/alert and oriented x 3 cranial nerves intact/normal strength/alert and oriented x 3/responds to verbal commands

## 2019-07-20 NOTE — CONSULT NOTE ADULT - ASSESSMENT
Impression:  1) Abdominal discomfort- Differential diagnosis includes peptic ulcer disease, dyspepsia, gastroparesis, functional dyspepsia, SIBO, IBS, lactose/fructose intolerance, malabsorption. CT negative for gallbladder disease or pancreatitis.     Recommendations:  -Pt hesitant to undergo EGD, and wants to explore other medical option before endoscopic evaluation  -Start PPI PO qday  -Lactose free diet  -Trial of Levsin 0.125 q4 hours as needed  -Rest of care per primary team Impression:  1) Abdominal discomfort- Differential diagnosis includes peptic ulcer disease, dyspepsia, gastroparesis, functional dyspepsia, SIBO, IBS, lactose/fructose intolerance, malabsorption. CT negative for gallbladder disease or pancreatitis.     Recommendations:  -Pt hesitant to undergo EGD, and wants to explore other medical options  -Start PPI PO qday  -Lactose free diet  -Trial of Levsin 0.125 q4 hours as needed  -Rest of care per primary team

## 2019-07-20 NOTE — PHYSICAL THERAPY INITIAL EVALUATION ADULT - PATIENT PROFILE REVIEW, REHAB EVAL
yes/PT orders received:  . Consult with JENNIFER BENDER, pt may participate in PT evaluation. PT orders received: no formal activity order. Consult with RN Rema BENDER, pt may participate in PT evaluation and ambulate with PT./yes

## 2019-07-20 NOTE — PROGRESS NOTE ADULT - SUBJECTIVE AND OBJECTIVE BOX
Patient is a 64y old  Female who presents with a chief complaint of Chest pressure (2019 05:04)      SUBJECTIVE / OVERNIGHT EVENTS: pt seen and examined, chart reviewed. Pt interviewed with Croatian  [id number]. Pt feels better, no more abd pain, just feels bloated. No BM since yesterday. tolerated diet. No N/V.      MEDICATIONS  (STANDING):  aspirin enteric coated 81 milliGRAM(s) Oral daily  fenofibrate Tablet 145 milliGRAM(s) Oral daily  folic acid 1 milliGRAM(s) Oral daily  heparin  Injectable 5000 Unit(s) SubCutaneous every 12 hours  nebivolol 2.5 milliGRAM(s) Oral daily  pantoprazole  Injectable 40 milliGRAM(s) IV Push daily  sodium chloride 0.9% lock flush 3 milliLiter(s) IV Push every 8 hours    MEDICATIONS  (PRN):  meclizine 25 milliGRAM(s) Oral three times a day PRN Dizziness  ondansetron Injectable 4 milliGRAM(s) IV Push every 6 hours PRN Nausea and/or Vomiting  zolpidem 5 milliGRAM(s) Oral at bedtime PRN Insomnia      Vital Signs Last 24 Hrs  T(C): 36.8 (2019 10:25), Max: 36.9 (2019 01:15)  T(F): 98.2 (2019 10:25), Max: 98.4 (2019 01:15)  HR: 89 (2019 10:25) (75 - 89)  BP: 132/82 (2019 10:25) (127/79 - 133/86)  BP(mean): --  RR: 16 (2019 10:25) (16 - 18)  SpO2: 100% (2019 10:25) (100% - 100%)  CAPILLARY BLOOD GLUCOSE        I&O's Summary      PHYSICAL EXAM:  GENERAL: NAD, well-developed  HEAD:  Atraumatic, Normocephalic  EYES: EOMI, PERRLA, conjunctiva and sclera clear  NECK: Supple, No JVD  CHEST/LUNG: Clear to auscultation bilaterally; No wheeze  HEART: Regular rate and rhythm; No murmurs, rubs, or gallops  ABDOMEN: Soft, Nontender, Nondistended; Bowel sounds present  EXTREMITIES:  2+ Peripheral Pulses, No clubbing, cyanosis, or edema  PSYCH: AAOx3  NEUROLOGY: non-focal  SKIN: No rashes or lesions    LABS:                        10.7   9.45  )-----------( 307      ( 2019 02:52 )             33.3     07-20    130<L>  |  98  |  6<L>  ----------------------------<  98  3.4<L>   |  19<L>  |  0.65    Ca    9.2      2019 02:52  Phos  4.0     -  Mg     1.9     -    TPro  7.4  /  Alb  3.8  /  TBili  0.4  /  DBili  x   /  AST  17  /  ALT  18  /  AlkPhos  67  07-20      CARDIAC MARKERS ( 2019 02:52 )  x     / x     / 50 u/L / 2.65 ng/mL / x          Urinalysis Basic - ( 2019 02:37 )    Color: COLORLESS / Appearance: CLEAR / S.037 / pH: 7.5  Gluc: NEGATIVE / Ketone: NEGATIVE  / Bili: NEGATIVE / Urobili: NORMAL   Blood: NEGATIVE / Protein: NEGATIVE / Nitrite: NEGATIVE   Leuk Esterase: NEGATIVE / RBC: x / WBC x   Sq Epi: x / Non Sq Epi: x / Bacteria: x    < from: CT Abdomen and Pelvis w/ IV Cont (19 @ 22:41) >      IMPRESSION:     The reason for the patient's abdominal pain is not elucidated on this   examination.    < end of copied text >      RADIOLOGY & ADDITIONAL TESTS:    Imaging Personally Reviewed:    Consultant(s) Notes Reviewed:      Care Discussed with Consultants/Other Providers:

## 2019-07-20 NOTE — H&P ADULT - RS GEN PE MLT RESP DETAILS PC
respirations non-labored/airway patent/good air movement/breath sounds equal/clear to auscultation bilaterally no rales/respirations non-labored/good air movement/no rhonchi/airway patent/breath sounds equal/clear to auscultation bilaterally

## 2019-07-21 DIAGNOSIS — R41.0 DISORIENTATION, UNSPECIFIED: ICD-10-CM

## 2019-07-21 DIAGNOSIS — F32.9 MAJOR DEPRESSIVE DISORDER, SINGLE EPISODE, UNSPECIFIED: ICD-10-CM

## 2019-07-21 DIAGNOSIS — F13.231 SEDATIVE, HYPNOTIC OR ANXIOLYTIC DEPENDENCE WITH WITHDRAWAL DELIRIUM: ICD-10-CM

## 2019-07-21 LAB
ALBUMIN SERPL ELPH-MCNC: 4.6 G/DL — SIGNIFICANT CHANGE UP (ref 3.3–5)
ALP SERPL-CCNC: 74 U/L — SIGNIFICANT CHANGE UP (ref 40–120)
ALT FLD-CCNC: 25 U/L — SIGNIFICANT CHANGE UP (ref 4–33)
ANION GAP SERPL CALC-SCNC: 18 MMO/L — HIGH (ref 7–14)
ANION GAP SERPL CALC-SCNC: 18 MMO/L — HIGH (ref 7–14)
AST SERPL-CCNC: 28 U/L — SIGNIFICANT CHANGE UP (ref 4–32)
BACTERIA UR CULT: SIGNIFICANT CHANGE UP
BASOPHILS # BLD AUTO: 0.03 K/UL — SIGNIFICANT CHANGE UP (ref 0–0.2)
BASOPHILS NFR BLD AUTO: 0.2 % — SIGNIFICANT CHANGE UP (ref 0–2)
BILIRUB SERPL-MCNC: 0.6 MG/DL — SIGNIFICANT CHANGE UP (ref 0.2–1.2)
BUN SERPL-MCNC: 10 MG/DL — SIGNIFICANT CHANGE UP (ref 7–23)
BUN SERPL-MCNC: 10 MG/DL — SIGNIFICANT CHANGE UP (ref 7–23)
CALCIUM SERPL-MCNC: 9.4 MG/DL — SIGNIFICANT CHANGE UP (ref 8.4–10.5)
CALCIUM SERPL-MCNC: 9.4 MG/DL — SIGNIFICANT CHANGE UP (ref 8.4–10.5)
CHLORIDE SERPL-SCNC: 89 MMOL/L — LOW (ref 98–107)
CHLORIDE SERPL-SCNC: 89 MMOL/L — LOW (ref 98–107)
CO2 SERPL-SCNC: 19 MMOL/L — LOW (ref 22–31)
CO2 SERPL-SCNC: 19 MMOL/L — LOW (ref 22–31)
CREAT SERPL-MCNC: 0.72 MG/DL — SIGNIFICANT CHANGE UP (ref 0.5–1.3)
CREAT SERPL-MCNC: 0.72 MG/DL — SIGNIFICANT CHANGE UP (ref 0.5–1.3)
EOSINOPHIL # BLD AUTO: 0.02 K/UL — SIGNIFICANT CHANGE UP (ref 0–0.5)
EOSINOPHIL NFR BLD AUTO: 0.2 % — SIGNIFICANT CHANGE UP (ref 0–6)
GLUCOSE SERPL-MCNC: 166 MG/DL — HIGH (ref 70–99)
GLUCOSE SERPL-MCNC: 166 MG/DL — HIGH (ref 70–99)
H PYLORI IGG SER-ACNC: 12.1 UNITS — SIGNIFICANT CHANGE UP
HCT VFR BLD CALC: 35.9 % — SIGNIFICANT CHANGE UP (ref 34.5–45)
HCT VFR BLD CALC: 35.9 % — SIGNIFICANT CHANGE UP (ref 34.5–45)
HGB BLD-MCNC: 11.8 G/DL — SIGNIFICANT CHANGE UP (ref 11.5–15.5)
HGB BLD-MCNC: 11.8 G/DL — SIGNIFICANT CHANGE UP (ref 11.5–15.5)
IMM GRANULOCYTES NFR BLD AUTO: 0.6 % — SIGNIFICANT CHANGE UP (ref 0–1.5)
LYMPHOCYTES # BLD AUTO: 18.3 % — SIGNIFICANT CHANGE UP (ref 13–44)
LYMPHOCYTES # BLD AUTO: 2.25 K/UL — SIGNIFICANT CHANGE UP (ref 1–3.3)
MAGNESIUM SERPL-MCNC: 1.8 MG/DL — SIGNIFICANT CHANGE UP (ref 1.6–2.6)
MCHC RBC-ENTMCNC: 26.9 PG — LOW (ref 27–34)
MCHC RBC-ENTMCNC: 26.9 PG — LOW (ref 27–34)
MCHC RBC-ENTMCNC: 32.9 % — SIGNIFICANT CHANGE UP (ref 32–36)
MCHC RBC-ENTMCNC: 32.9 % — SIGNIFICANT CHANGE UP (ref 32–36)
MCV RBC AUTO: 81.8 FL — SIGNIFICANT CHANGE UP (ref 80–100)
MCV RBC AUTO: 81.8 FL — SIGNIFICANT CHANGE UP (ref 80–100)
MONOCYTES # BLD AUTO: 1.14 K/UL — HIGH (ref 0–0.9)
MONOCYTES NFR BLD AUTO: 9.3 % — SIGNIFICANT CHANGE UP (ref 2–14)
NEUTROPHILS # BLD AUTO: 8.81 K/UL — HIGH (ref 1.8–7.4)
NEUTROPHILS NFR BLD AUTO: 71.4 % — SIGNIFICANT CHANGE UP (ref 43–77)
NRBC # FLD: 0 K/UL — SIGNIFICANT CHANGE UP (ref 0–0)
NRBC # FLD: 0 K/UL — SIGNIFICANT CHANGE UP (ref 0–0)
OSMOLALITY SERPL: 268 MOSMO/KG — LOW (ref 275–295)
PHOSPHATE SERPL-MCNC: 4.3 MG/DL — SIGNIFICANT CHANGE UP (ref 2.5–4.5)
PLATELET # BLD AUTO: 436 K/UL — HIGH (ref 150–400)
PLATELET # BLD AUTO: 436 K/UL — HIGH (ref 150–400)
PMV BLD: 9.1 FL — SIGNIFICANT CHANGE UP (ref 7–13)
PMV BLD: 9.1 FL — SIGNIFICANT CHANGE UP (ref 7–13)
POTASSIUM SERPL-MCNC: 3.5 MMOL/L — SIGNIFICANT CHANGE UP (ref 3.5–5.3)
POTASSIUM SERPL-MCNC: 3.5 MMOL/L — SIGNIFICANT CHANGE UP (ref 3.5–5.3)
POTASSIUM SERPL-SCNC: 3.5 MMOL/L — SIGNIFICANT CHANGE UP (ref 3.5–5.3)
POTASSIUM SERPL-SCNC: 3.5 MMOL/L — SIGNIFICANT CHANGE UP (ref 3.5–5.3)
PROT SERPL-MCNC: 8.5 G/DL — HIGH (ref 6–8.3)
RBC # BLD: 4.39 M/UL — SIGNIFICANT CHANGE UP (ref 3.8–5.2)
RBC # BLD: 4.39 M/UL — SIGNIFICANT CHANGE UP (ref 3.8–5.2)
RBC # FLD: 13.7 % — SIGNIFICANT CHANGE UP (ref 10.3–14.5)
RBC # FLD: 13.7 % — SIGNIFICANT CHANGE UP (ref 10.3–14.5)
SODIUM SERPL-SCNC: 126 MMOL/L — LOW (ref 135–145)
SODIUM SERPL-SCNC: 126 MMOL/L — LOW (ref 135–145)
SPECIMEN SOURCE: SIGNIFICANT CHANGE UP
URATE SERPL-MCNC: 2.3 MG/DL — LOW (ref 2.5–7)
WBC # BLD: 12.32 K/UL — HIGH (ref 3.8–10.5)
WBC # BLD: 12.32 K/UL — HIGH (ref 3.8–10.5)
WBC # FLD AUTO: 12.32 K/UL — HIGH (ref 3.8–10.5)
WBC # FLD AUTO: 12.32 K/UL — HIGH (ref 3.8–10.5)

## 2019-07-21 PROCEDURE — 90792 PSYCH DIAG EVAL W/MED SRVCS: CPT

## 2019-07-21 PROCEDURE — 99231 SBSQ HOSP IP/OBS SF/LOW 25: CPT

## 2019-07-21 PROCEDURE — 99233 SBSQ HOSP IP/OBS HIGH 50: CPT

## 2019-07-21 RX ORDER — CLONAZEPAM 1 MG
1 TABLET ORAL
Refills: 0 | Status: DISCONTINUED | OUTPATIENT
Start: 2019-07-21 | End: 2019-07-24

## 2019-07-21 RX ORDER — OLANZAPINE 15 MG/1
1.25 TABLET, FILM COATED ORAL EVERY 6 HOURS
Refills: 0 | Status: DISCONTINUED | OUTPATIENT
Start: 2019-07-21 | End: 2019-07-24

## 2019-07-21 RX ORDER — SODIUM CHLORIDE 9 MG/ML
1000 INJECTION INTRAMUSCULAR; INTRAVENOUS; SUBCUTANEOUS
Refills: 0 | Status: DISCONTINUED | OUTPATIENT
Start: 2019-07-21 | End: 2019-07-22

## 2019-07-21 RX ADMIN — PANTOPRAZOLE SODIUM 40 MILLIGRAM(S): 20 TABLET, DELAYED RELEASE ORAL at 14:56

## 2019-07-21 RX ADMIN — NEBIVOLOL HYDROCHLORIDE 2.5 MILLIGRAM(S): 5 TABLET ORAL at 09:52

## 2019-07-21 RX ADMIN — Medication 100 MILLIGRAM(S): at 14:56

## 2019-07-21 RX ADMIN — SODIUM CHLORIDE 50 MILLILITER(S): 9 INJECTION INTRAMUSCULAR; INTRAVENOUS; SUBCUTANEOUS at 23:06

## 2019-07-21 RX ADMIN — SODIUM CHLORIDE 3 MILLILITER(S): 9 INJECTION INTRAMUSCULAR; INTRAVENOUS; SUBCUTANEOUS at 22:22

## 2019-07-21 RX ADMIN — OLANZAPINE 1.25 MILLIGRAM(S): 15 TABLET, FILM COATED ORAL at 19:50

## 2019-07-21 RX ADMIN — SENNA PLUS 2 TABLET(S): 8.6 TABLET ORAL at 22:33

## 2019-07-21 RX ADMIN — Medication 81 MILLIGRAM(S): at 14:55

## 2019-07-21 RX ADMIN — SODIUM CHLORIDE 3 MILLILITER(S): 9 INJECTION INTRAMUSCULAR; INTRAVENOUS; SUBCUTANEOUS at 14:52

## 2019-07-21 RX ADMIN — Medication 1 MILLIGRAM(S): at 14:55

## 2019-07-21 RX ADMIN — SODIUM CHLORIDE 3 MILLILITER(S): 9 INJECTION INTRAMUSCULAR; INTRAVENOUS; SUBCUTANEOUS at 05:52

## 2019-07-21 RX ADMIN — Medication 100 MILLIGRAM(S): at 22:33

## 2019-07-21 RX ADMIN — ONDANSETRON 4 MILLIGRAM(S): 8 TABLET, FILM COATED ORAL at 12:23

## 2019-07-21 RX ADMIN — Medication 145 MILLIGRAM(S): at 14:55

## 2019-07-21 RX ADMIN — Medication 1 MILLIGRAM(S): at 22:33

## 2019-07-21 NOTE — PROGRESS NOTE ADULT - PROBLEM SELECTOR PLAN 6
R/o underlying Psychiatric issues.  Enhanced supervision.  Psych consult. Pt is psychotic, ? contributing to her current presentation. Pt refused interview with [facility] . R/o underlying Psychiatric issues.  -1:1 for safety until cleared by Psych consult  -Psych consult.

## 2019-07-21 NOTE — PROVIDER CONTACT NOTE (OTHER) - ASSESSMENT
Attempted to utilize [facility]  Chika #[id number], stated unable to understand pt dialect [place] and unable to obtain . Grandson Yinka Atkinson assisted w translation at bedside, pt A&Ox3, disoriented to time, grandson stated she is speaking illogically, possibly hearing things and confused by other people in room, pt seems anxious. VSS, /93, HR 90, refusing AM meds bystolic

## 2019-07-21 NOTE — PROVIDER CONTACT NOTE (OTHER) - ACTION/TREATMENT ORDERED:
provider to f/u, possibly reorder imaging if disorientation continues
attempt to obtain labs later in the day
pt ordered for tele, repeat troponin, CBC, BMP, creatinine kinase and stat EKG

## 2019-07-21 NOTE — BEHAVIORAL HEALTH ASSESSMENT NOTE - NSBHCHARTREVIEWLAB_PSY_A_CORE FT
07-20    130<L>  |  98  |  6<L>  ----------------------------<  98  3.4<L>   |  19<L>  |  0.65    Ca    9.2      20 Jul 2019 02:52  Phos  4.0     07-20  Mg     1.9     07-20    TPro  7.4  /  Alb  3.8  /  TBili  0.4  /  DBili  x   /  AST  17  /  ALT  18  /  AlkPhos  67  07-20                          11.1   8.59  )-----------( 301      ( 20 Jul 2019 11:30 )             33.7

## 2019-07-21 NOTE — BEHAVIORAL HEALTH ASSESSMENT NOTE - SUMMARY
63 y/o Female with possible PPH of depression/anxiety, PMH of HTN, HLD, hyponatremia presents with abdominal discomfort which describes as burning/bloating sensation with associated nausea and no vomiting x 1 week. Psych c/s for new onset paranoia, AH/VH, confusion.     Patient exhibits delirium with waxing/waning paranoia/hallucinations/sensorium which may be 2/2 delayed clonazepam withdrawal given long half-life of this med, no tremors but N/V, possible headaches (pt clutching head), confusion and hallucinations present. Would restart on klonopin with CIWA for now, can use zyprexa PRN if necessary for agitation. Also may consider delirium 2/2 meclizine use as patient has gotten some PRNs of this. Given history from family unlikely to be dementia as patient's condition appears to have started acutely. On mirtazapine at home but would hold off on restarting this for now. Discussed risks/benefits of above meds with family, amenable to use.     Na stabilizing per primary team. CThead wnl. Would f/u b12, folate, RPR.

## 2019-07-21 NOTE — PROVIDER CONTACT NOTE (OTHER) - BACKGROUND
pt admitted for worsening abd pain. hx of HTN and recent life stressor of moving to the [place] to live with family
pt admitted last night, labs drawn at 2:30 w admission
pt complaining of epigastric pain x1 week, discharged from [facility] w/o relief admitted today for hyponatremia

## 2019-07-21 NOTE — BEHAVIORAL HEALTH ASSESSMENT NOTE - HPI (INCLUDE ILLNESS QUALITY, SEVERITY, DURATION, TIMING, CONTEXT, MODIFYING FACTORS, ASSOCIATED SIGNS AND SYMPTOMS)
63 y/o Female with possible PPH of depression/anxiety, PMH of HTN, HLD, hyponatremia presents with abdominal discomfort which describes as burning/bloating sensation with associated nausea and no vomiting x 1 week. Psych c/s for new onset paranoia, AH/VH, confusion.     Patient AOx0-1 on exam, does not engage with writer, spoke to patient in native language - cannot confirm AH/VH on exam but patient errantly praying, appears disoriented, fearful, tachypneic and uncomfortable. Waxing/waning per staff and family.     Per grandson at bedside and family over phone [phone]: patient has no history of confusion, memory loss, AH/VH, delusions or paranoia. Had been started on mirtazapine and clonazepam in [facility], had been on the former for sleep/anxiety/headaches(?) for 15y, had been on klonopin (dispan brand name in [facility]) 2mg qHS for several months, had been taking it every night. No prior suicide attempts, psych hospitalizations, never seen psychiatrist. At baseline state patient is AOx3. [place]oved to [place] >3[place] ago. No known EtOH use/abuse. Reviewed pictures of all meds patient has been taking at home which are all continued from MD's in [facility]. Also used to take b1/b6/b12 vials regularly "for her stomach". Patient here has been increasingly paranoid, worried that people are trying to kill her, having illusions from noises and words in surrounding.

## 2019-07-21 NOTE — BEHAVIORAL HEALTH ASSESSMENT NOTE - NSBHCHARTREVIEWIMAGING_PSY_A_CORE FT
< from: CT Head No Cont (07.19.19 @ 22:39) >    IMPRESSION:   There is no acute intracranial hemorrhage, mass effect, midline shift or   extra axial collections.     < end of copied text >

## 2019-07-21 NOTE — PROGRESS NOTE ADULT - ASSESSMENT
Impression:  1) Abdominal discomfort- Differential diagnosis includes peptic ulcer disease, dyspepsia, gastroparesis, functional dyspepsia, SIBO, IBS, lactose/fructose intolerance, malabsorption. CT negative for gallbladder disease or pancreatitis.     Recommendations:  -Pt hesitant to undergo EGD, and wants to continue medical treatment  -Start PPI PO qday  -Non-urgent EGD can be performed as an outpatient if pt's symptoms improve  -Lactose free diet  -Trial of Levsin 0.125 q4 hours as needed  -Rest of care per primary team Impression:  1) Abdominal discomfort- Differential diagnosis includes peptic ulcer disease, dyspepsia, gastroparesis, functional dyspepsia, SIBO, IBS, lactose/fructose intolerance, malabsorption. CT negative for gallbladder disease or pancreatitis.     Recommendations:  -Pt hesitant to undergo EGD, and wants to continue medical treatment  -Start PPI PO qday  -Non-urgent EGD can be performed as an outpatient if pt's symptoms improve  -Lactose free diet  -Trial of Levsin 0.125 q4 hours as needed  -Rest of care per primary team     Addendum: pt now wants procedure, keep NPO past midnight Impression:  1) Abdominal discomfort- Differential diagnosis includes peptic ulcer disease, dyspepsia, gastroparesis, functional dyspepsia, SIBO, IBS, lactose/fructose intolerance, malabsorption. CT negative for gallbladder disease or pancreatitis.     Recommendations:  - EGD Monday AM  -Start PPI PO qday  -Lactose free diet  -Trial of Levsin 0.125 q4 hours as needed  -Rest of care per primary team

## 2019-07-21 NOTE — CHART NOTE - NSCHARTNOTEFT_GEN_A_CORE
Notified by RN that patient leanne at the bedside states that the patient is complaining of hearing voices that are threatening to harm her and the patient believes that the other patients in the room want her out.   Grandson states that this began last night and patient did not get much sleep. Patient speaks a Portuguese dialect ([place]het[place]) and that  is not available at this time.  Grandson acted as .  Patient A&O x 2 (name and location), able to indicate why she was admitted-- belly pain.  On evaluation she is in no acute distress and does not exhibit any focal neuro deficits (no facial droop, slurred speech or extremity weakness noted).  Unclear if sundowning, anxiety or other psychological reason for complaints.  Continue to monitor, may warrant Psych consult for further evaluation.

## 2019-07-21 NOTE — BEHAVIORAL HEALTH ASSESSMENT NOTE - NSBHCHARTREVIEWVS_PSY_A_CORE FT
Vital Signs Last 24 Hrs  T(C): 36.8 (21 Jul 2019 11:31), Max: 36.9 (21 Jul 2019 05:49)  T(F): 98.2 (21 Jul 2019 11:31), Max: 98.4 (21 Jul 2019 05:49)  HR: 113 (21 Jul 2019 11:31) (74 - 113)  BP: 151/96 (21 Jul 2019 11:31) (132/84 - 151/96)  BP(mean): --  RR: 16 (21 Jul 2019 11:31) (16 - 19)  SpO2: 100% (21 Jul 2019 11:31) (100% - 100%)

## 2019-07-21 NOTE — PROGRESS NOTE ADULT - ASSESSMENT
65 y/o F with hx of HTN, HLD, hyponatremia presents with abdominal discomfort which describes as burning/bloating sensation with associated nausea x 1 week.

## 2019-07-21 NOTE — PROVIDER CONTACT NOTE (OTHER) - SITUATION
pt anxious and disoriented/confused
pt admitted to 4S, complaining of epigastric discomfort and chest pressure
pt refused AM labs at this time

## 2019-07-21 NOTE — PROGRESS NOTE ADULT - SUBJECTIVE AND OBJECTIVE BOX
Chief Complaint:  Patient is a 64y old  Female who presents with a chief complaint of Chest pressure (2019 11:35)      Interval Events: Pt continues to endorse abdominal discomfort.   ROS: All 12 point system except listed above were otherwise negative.    Allergies:  No Known Allergies        Hospital Medications:  aspirin enteric coated 81 milliGRAM(s) Oral daily  docusate sodium 100 milliGRAM(s) Oral three times a day  fenofibrate Tablet 145 milliGRAM(s) Oral daily  folic acid 1 milliGRAM(s) Oral daily  heparin  Injectable 5000 Unit(s) SubCutaneous every 12 hours  hyoscyamine SL 0.125 milliGRAM(s) SubLingual every 4 hours PRN  meclizine 25 milliGRAM(s) Oral three times a day PRN  nebivolol 2.5 milliGRAM(s) Oral daily  ondansetron Injectable 4 milliGRAM(s) IV Push every 6 hours PRN  pantoprazole  Injectable 40 milliGRAM(s) IV Push daily  polyethylene glycol 3350 17 Gram(s) Oral daily  senna 2 Tablet(s) Oral at bedtime  sodium chloride 0.9% lock flush 3 milliLiter(s) IV Push every 8 hours  zolpidem 5 milliGRAM(s) Oral at bedtime PRN      PMHX/PSHX:  Hyponatremia  Hyperlipemia  Hypertension  No significant past surgical history      Family history:  No pertinent family history in first degree relatives    There is no family history of peptic ulcer disease, gastric cancer, colon polyps, colon cancer, celiac disease, biliary, hepatic, or pancreatic disease.  None of the female relatives have breast, uterine, or ovarian cancer.     PHYSICAL EXAM:   Vital Signs:  Vital Signs Last 24 Hrs  T(C): 36.8 (2019 09:49), Max: 36.9 (2019 05:49)  T(F): 98.3 (2019 09:49), Max: 98.4 (2019 05:49)  HR: 91 (2019 09:49) (74 - 91)  BP: 147/83 (2019 09:49) (132/82 - 151/93)  BP(mean): --  RR: 19 (2019 09:49) (16 - 19)  SpO2: 100% (2019 09:49) (100% - 100%)  Daily     Daily     PHYSICAL EXAM:     GENERAL:  Appears stated age, well-groomed  HEENT:  NC/AT,  conjunctivae clear and pink  CHEST:  Full & symmetric excursion, no increased effort, breath sounds clear  HEART:  Regular rhythm, S1, S2  ABDOMEN:  Soft, non-tender, non-distended  EXTEREMITIES:  no cyanosis,clubbing or edema  SKIN:  No rash/erythema/ecchymoses    LABS:                        11.1   8.59  )-----------( 301      ( 2019 11:30 )             33.7     Mean Cell Volume: 82.0 fL (- @ 11:30)    -    130<L>  |  98  |  6<L>  ----------------------------<  98  3.4<L>   |  19<L>  |  0.65    Ca    9.2      2019 02:52  Phos  4.0       Mg     1.9         TPro  7.4  /  Alb  3.8  /  TBili  0.4  /  DBili  x   /  AST  17  /  ALT  18  /  AlkPhos  67  07-20    LIVER FUNCTIONS - ( 2019 02:52 )  Alb: 3.8 g/dL / Pro: 7.4 g/dL / ALK PHOS: 67 u/L / ALT: 18 u/L / AST: 17 u/L / GGT: x             Urinalysis Basic - ( 2019 02:37 )    Color: COLORLESS / Appearance: CLEAR / S.037 / pH: 7.5  Gluc: NEGATIVE / Ketone: NEGATIVE  / Bili: NEGATIVE / Urobili: NORMAL   Blood: NEGATIVE / Protein: NEGATIVE / Nitrite: NEGATIVE   Leuk Esterase: NEGATIVE / RBC: x / WBC x   Sq Epi: x / Non Sq Epi: x / Bacteria: x                              11.1   8.59  )-----------( 301      ( 2019 11:30 )             33.7                         10.7   9.45  )-----------( 307      ( 2019 02:52 )             33.3                         11.5   9.81  )-----------( 335      ( 2019 19:50 )             35.6     Imaging:

## 2019-07-21 NOTE — BEHAVIORAL HEALTH ASSESSMENT NOTE - NSBHCONSULTMEDS_PSY_A_CORE FT
begin klonopin 1mg BID  CIWA  f/u ECG for QTc, cannot use zyprexa if prolonged begin klonopin 1mg BID  CIWA  f/u B12, folate, RPR  f/u ECG for QTc, cannot use zyprexa if prolonged

## 2019-07-21 NOTE — PROGRESS NOTE ADULT - SUBJECTIVE AND OBJECTIVE BOX
Patient is a 64y old  Female who presents with a chief complaint of Chest pressure (2019 09:52)      SUBJECTIVE / OVERNIGHT EVENTS: Pt interviewed with Grand son at bedside. Pt denies any abd pain, no N/V, (+) BM, tolerated PO. According to grand son, pt has been feeling insecure and hear things (Hallucination).     MEDICATIONS  (STANDING):  aspirin enteric coated 81 milliGRAM(s) Oral daily  docusate sodium 100 milliGRAM(s) Oral three times a day  fenofibrate Tablet 145 milliGRAM(s) Oral daily  folic acid 1 milliGRAM(s) Oral daily  heparin  Injectable 5000 Unit(s) SubCutaneous every 12 hours  nebivolol 2.5 milliGRAM(s) Oral daily  pantoprazole  Injectable 40 milliGRAM(s) IV Push daily  polyethylene glycol 3350 17 Gram(s) Oral daily  senna 2 Tablet(s) Oral at bedtime  sodium chloride 0.9% lock flush 3 milliLiter(s) IV Push every 8 hours    MEDICATIONS  (PRN):  hyoscyamine SL 0.125 milliGRAM(s) SubLingual every 4 hours PRN abd pain  meclizine 25 milliGRAM(s) Oral three times a day PRN Dizziness  ondansetron Injectable 4 milliGRAM(s) IV Push every 6 hours PRN Nausea and/or Vomiting  zolpidem 5 milliGRAM(s) Oral at bedtime PRN Insomnia      Vital Signs Last 24 Hrs  T(C): 36.8 (2019 09:49), Max: 36.9 (2019 05:49)  T(F): 98.3 (2019 09:49), Max: 98.4 (2019 05:49)  HR: 91 (2019 09:49) (74 - 91)  BP: 147/83 (2019 09:49) (132/84 - 151/93)  BP(mean): --  RR: 19 (2019 09:49) (16 - 19)  SpO2: 100% (2019 09:49) (100% - 100%)  CAPILLARY BLOOD GLUCOSE        I&O's Summary      PHYSICAL EXAM:  GENERAL: NAD, well-developed  HEAD:  Atraumatic, Normocephalic  EYES: EOMI, PERRLA, conjunctiva and sclera clear  NECK: Supple, No JVD  CHEST/LUNG: Clear to auscultation bilaterally; No wheeze  HEART: Regular rate and rhythm; No murmurs, rubs, or gallops  ABDOMEN: Soft, Nontender, Nondistended; Bowel sounds present  EXTREMITIES:  2+ Peripheral Pulses, No clubbing, cyanosis, or edema  PSYCH: AAOx3  NEUROLOGY: non-focal  SKIN: No rashes or lesions    LABS:                        11.1   8.59  )-----------( 301      ( 2019 11:30 )             33.7     07-20    130<L>  |  98  |  6<L>  ----------------------------<  98  3.4<L>   |  19<L>  |  0.65    Ca    9.2      2019 02:52  Phos  4.0     -  Mg     1.9     -    TPro  7.4  /  Alb  3.8  /  TBili  0.4  /  DBili  x   /  AST  17  /  ALT  18  /  AlkPhos  67  07-20      CARDIAC MARKERS ( 2019 02:52 )  x     / x     / 50 u/L / 2.65 ng/mL / x          Urinalysis Basic - ( 2019 02:37 )    Color: COLORLESS / Appearance: CLEAR / S.037 / pH: 7.5  Gluc: NEGATIVE / Ketone: NEGATIVE  / Bili: NEGATIVE / Urobili: NORMAL   Blood: NEGATIVE / Protein: NEGATIVE / Nitrite: NEGATIVE   Leuk Esterase: NEGATIVE / RBC: x / WBC x   Sq Epi: x / Non Sq Epi: x / Bacteria: x        RADIOLOGY & ADDITIONAL TESTS:    Imaging Personally Reviewed:    Consultant(s) Notes Reviewed:      Care Discussed with Consultants/Other Providers: Patient is a 64y old  Female who presents with a chief complaint of Chest pressure (2019 09:52)      SUBJECTIVE / OVERNIGHT EVENTS: Pt interviewed with Setswana  ID [id number] and Grand son at bedside. Pt refused to talk to Setswana .  History obtained from the  help of her grand son.  Pt denies any abd pain, no N/V, (+) BM, tolerated PO. According to grand son, pt has been feeling insecure, anxious and hear things (Hallucination).     MEDICATIONS  (STANDING):  aspirin enteric coated 81 milliGRAM(s) Oral daily  docusate sodium 100 milliGRAM(s) Oral three times a day  fenofibrate Tablet 145 milliGRAM(s) Oral daily  folic acid 1 milliGRAM(s) Oral daily  heparin  Injectable 5000 Unit(s) SubCutaneous every 12 hours  nebivolol 2.5 milliGRAM(s) Oral daily  pantoprazole  Injectable 40 milliGRAM(s) IV Push daily  polyethylene glycol 3350 17 Gram(s) Oral daily  senna 2 Tablet(s) Oral at bedtime  sodium chloride 0.9% lock flush 3 milliLiter(s) IV Push every 8 hours    MEDICATIONS  (PRN):  hyoscyamine SL 0.125 milliGRAM(s) SubLingual every 4 hours PRN abd pain  meclizine 25 milliGRAM(s) Oral three times a day PRN Dizziness  ondansetron Injectable 4 milliGRAM(s) IV Push every 6 hours PRN Nausea and/or Vomiting  zolpidem 5 milliGRAM(s) Oral at bedtime PRN Insomnia      Vital Signs Last 24 Hrs  T(C): 36.8 (2019 09:49), Max: 36.9 (2019 05:49)  T(F): 98.3 (2019 09:49), Max: 98.4 (2019 05:49)  HR: 91 (2019 09:49) (74 - 91)  BP: 147/83 (2019 09:49) (132/84 - 151/93)  BP(mean): --  RR: 19 (2019 09:49) (16 - 19)  SpO2: 100% (2019 09:49) (100% - 100%)  CAPILLARY BLOOD GLUCOSE        I&O's Summary      PHYSICAL EXAM:  GENERAL: NAD, well-developed  HEAD:  Atraumatic, Normocephalic  EYES: EOMI, PERRLA, conjunctiva and sclera clear  NECK: Supple, No JVD  CHEST/LUNG: Clear to auscultation bilaterally; No wheeze  HEART: Regular rate and rhythm; No murmurs, rubs, or gallops  ABDOMEN: Soft, Nontender, Nondistended; Bowel sounds present  EXTREMITIES:  2+ Peripheral Pulses, No clubbing, cyanosis, or edema  PSYCH: AAOx3  NEUROLOGY: non-focal  SKIN: No rashes or lesions    LABS:                        11.1   8.59  )-----------( 301      ( 2019 11:30 )             33.7     07-20    130<L>  |  98  |  6<L>  ----------------------------<  98  3.4<L>   |  19<L>  |  0.65    Ca    9.2      2019 02:52  Phos  4.0     07-20  Mg     1.9     07-20    TPro  7.4  /  Alb  3.8  /  TBili  0.4  /  DBili  x   /  AST  17  /  ALT  18  /  AlkPhos  67  07-20      CARDIAC MARKERS ( 2019 02:52 )  x     / x     / 50 u/L / 2.65 ng/mL / x          Urinalysis Basic - ( 2019 02:37 )    Color: COLORLESS / Appearance: CLEAR / S.037 / pH: 7.5  Gluc: NEGATIVE / Ketone: NEGATIVE  / Bili: NEGATIVE / Urobili: NORMAL   Blood: NEGATIVE / Protein: NEGATIVE / Nitrite: NEGATIVE   Leuk Esterase: NEGATIVE / RBC: x / WBC x   Sq Epi: x / Non Sq Epi: x / Bacteria: x        RADIOLOGY & ADDITIONAL TESTS:    Imaging Personally Reviewed:    Consultant(s) Notes Reviewed:      Care Discussed with Consultants/Other Providers:

## 2019-07-21 NOTE — PROVIDER CONTACT NOTE (OTHER) - RECOMMENDATIONS
Pt may need to be seen by psych in regards to disorientation/confused. AM meds to be given later in day
order pt for tele monitoring
pt agreed to have labs drawn after breakfast

## 2019-07-22 LAB
ANION GAP SERPL CALC-SCNC: 14 MMO/L — SIGNIFICANT CHANGE UP (ref 7–14)
BUN SERPL-MCNC: 9 MG/DL — SIGNIFICANT CHANGE UP (ref 7–23)
CALCIUM SERPL-MCNC: 9.3 MG/DL — SIGNIFICANT CHANGE UP (ref 8.4–10.5)
CHLORIDE SERPL-SCNC: 97 MMOL/L — LOW (ref 98–107)
CO2 SERPL-SCNC: 21 MMOL/L — LOW (ref 22–31)
CREAT SERPL-MCNC: 0.72 MG/DL — SIGNIFICANT CHANGE UP (ref 0.5–1.3)
FOLATE SERPL-MCNC: > 20 NG/ML — HIGH (ref 4.7–20)
GLUCOSE SERPL-MCNC: 112 MG/DL — HIGH (ref 70–99)
HCT VFR BLD CALC: 33.9 % — LOW (ref 34.5–45)
HGB BLD-MCNC: 11 G/DL — LOW (ref 11.5–15.5)
MAGNESIUM SERPL-MCNC: 2 MG/DL — SIGNIFICANT CHANGE UP (ref 1.6–2.6)
MCHC RBC-ENTMCNC: 26.3 PG — LOW (ref 27–34)
MCHC RBC-ENTMCNC: 32.4 % — SIGNIFICANT CHANGE UP (ref 32–36)
MCV RBC AUTO: 80.9 FL — SIGNIFICANT CHANGE UP (ref 80–100)
NRBC # FLD: 0 K/UL — SIGNIFICANT CHANGE UP (ref 0–0)
PHOSPHATE SERPL-MCNC: 4.2 MG/DL — SIGNIFICANT CHANGE UP (ref 2.5–4.5)
PLATELET # BLD AUTO: 336 K/UL — SIGNIFICANT CHANGE UP (ref 150–400)
PMV BLD: 8.5 FL — SIGNIFICANT CHANGE UP (ref 7–13)
POTASSIUM SERPL-MCNC: 3.9 MMOL/L — SIGNIFICANT CHANGE UP (ref 3.5–5.3)
POTASSIUM SERPL-SCNC: 3.9 MMOL/L — SIGNIFICANT CHANGE UP (ref 3.5–5.3)
RBC # BLD: 4.19 M/UL — SIGNIFICANT CHANGE UP (ref 3.8–5.2)
RBC # FLD: 13.7 % — SIGNIFICANT CHANGE UP (ref 10.3–14.5)
SODIUM SERPL-SCNC: 132 MMOL/L — LOW (ref 135–145)
T PALLIDUM AB TITR SER: NEGATIVE — SIGNIFICANT CHANGE UP
VIT B12 SERPL-MCNC: 2000 PG/ML — HIGH (ref 200–900)
WBC # BLD: 9.67 K/UL — SIGNIFICANT CHANGE UP (ref 3.8–10.5)
WBC # FLD AUTO: 9.67 K/UL — SIGNIFICANT CHANGE UP (ref 3.8–10.5)

## 2019-07-22 PROCEDURE — 99233 SBSQ HOSP IP/OBS HIGH 50: CPT

## 2019-07-22 PROCEDURE — 99232 SBSQ HOSP IP/OBS MODERATE 35: CPT

## 2019-07-22 PROCEDURE — 99232 SBSQ HOSP IP/OBS MODERATE 35: CPT | Mod: GC

## 2019-07-22 RX ORDER — SODIUM CHLORIDE 9 MG/ML
1000 INJECTION INTRAMUSCULAR; INTRAVENOUS; SUBCUTANEOUS
Refills: 0 | Status: DISCONTINUED | OUTPATIENT
Start: 2019-07-22 | End: 2019-07-24

## 2019-07-22 RX ADMIN — PANTOPRAZOLE SODIUM 40 MILLIGRAM(S): 20 TABLET, DELAYED RELEASE ORAL at 11:36

## 2019-07-22 RX ADMIN — SODIUM CHLORIDE 3 MILLILITER(S): 9 INJECTION INTRAMUSCULAR; INTRAVENOUS; SUBCUTANEOUS at 06:15

## 2019-07-22 RX ADMIN — Medication 81 MILLIGRAM(S): at 11:35

## 2019-07-22 RX ADMIN — Medication 1 MILLIGRAM(S): at 17:03

## 2019-07-22 RX ADMIN — NEBIVOLOL HYDROCHLORIDE 2.5 MILLIGRAM(S): 5 TABLET ORAL at 11:35

## 2019-07-22 RX ADMIN — Medication 145 MILLIGRAM(S): at 11:35

## 2019-07-22 RX ADMIN — SODIUM CHLORIDE 3 MILLILITER(S): 9 INJECTION INTRAMUSCULAR; INTRAVENOUS; SUBCUTANEOUS at 21:14

## 2019-07-22 RX ADMIN — Medication 1 MILLIGRAM(S): at 11:35

## 2019-07-22 RX ADMIN — SODIUM CHLORIDE 50 MILLILITER(S): 9 INJECTION INTRAMUSCULAR; INTRAVENOUS; SUBCUTANEOUS at 17:08

## 2019-07-22 RX ADMIN — SODIUM CHLORIDE 3 MILLILITER(S): 9 INJECTION INTRAMUSCULAR; INTRAVENOUS; SUBCUTANEOUS at 14:14

## 2019-07-22 RX ADMIN — HEPARIN SODIUM 5000 UNIT(S): 5000 INJECTION INTRAVENOUS; SUBCUTANEOUS at 06:18

## 2019-07-22 RX ADMIN — Medication 1 MILLIGRAM(S): at 10:12

## 2019-07-22 NOTE — PROGRESS NOTE BEHAVIORAL HEALTH - RISK ASSESSMENT
risk for harm due to confusion risk for harm due to confusion and delirium  Will continue to follow up

## 2019-07-22 NOTE — PROGRESS NOTE ADULT - ASSESSMENT
Impression:  1) Abdominal discomfort- Differential diagnosis includes peptic ulcer disease, dyspepsia, gastroparesis, functional dyspepsia, SIBO, IBS, lactose/fructose intolerance, malabsorption. CT negative for gallbladder disease or pancreatitis.   2) Hyponatremia- chronic    Recommendations:  -Pending Na today, will decide if EGD can be done (ideally has to be >130)  -Start PPI PO qday  -Lactose free diet  -Trial of Levsin 0.125 q4 hours as needed  -Rest of care per primary team Impression:  1) Abdominal discomfort- Differential diagnosis includes peptic ulcer disease, dyspepsia, gastroparesis, functional dyspepsia, SIBO, IBS, lactose/fructose intolerance, malabsorption. CT negative for gallbladder disease or pancreatitis.   2) Hyponatremia- chronic    Recommendations:  -Pending Na today, will decide if EGD can be done (ideally has to be >130)- Likely plan for tomorrow   -Start PPI PO qday  -Lactose free diet  -Trial of Levsin 0.125 q4 hours as needed  -Rest of care per primary team Impression:  1) Abdominal discomfort- Differential diagnosis includes peptic ulcer disease, dyspepsia, gastroparesis, functional dyspepsia, SIBO, IBS, lactose/fructose intolerance, malabsorption. CT negative for gallbladder disease or pancreatitis.   2) Hyponatremia- chronic    Recommendations  -Work up hyponatremia, low chloride, anion gap  -Tentative EGD tomorrow   -Keep NPO past midnightw   -Start PPI PO qday  -Lactose free diet  -Trial of Levsin 0.125 q4 hours as needed  -Rest of care per primary team Impression:  1) Abdominal discomfort- Differential diagnosis includes peptic ulcer disease, dyspepsia, gastroparesis, functional dyspepsia, SIBO, IBS, lactose/fructose intolerance, malabsorption. CT negative for gallbladder disease or pancreatitis.   2) Hyponatremia- chronic    Recommendations  -Work up hyponatremia, low chloride, anion gap  -Tentative EGD tomorrow   -Keep NPO past midnight  -Start PPI PO qday  -Lactose free diet  -Trial of Levsin 0.125 q4 hours as needed  -Rest of care per primary team

## 2019-07-22 NOTE — PROGRESS NOTE ADULT - PROBLEM SELECTOR PLAN 6
Pt is psychotic, ? contributing to her current presentation. Pt refused interview with [facility] . R/o underlying Psychiatric issues.  -1:1 for safety until cleared by Psych consult  -Psych consult.

## 2019-07-22 NOTE — PROGRESS NOTE BEHAVIORAL HEALTH - SUMMARY
65 y/o Female with possible PPH of depression/anxiety, PMH of HTN, HLD, hyponatremia presents with abdominal discomfort which describes as burning/bloating sensation with associated nausea and no vomiting x 1 week. Psych c/s for new onset paranoia, AH/VH, confusion.     Patient exhibits delirium with waxing/waning paranoia/hallucinations/sensorium which may be 2/2 delayed clonazepam withdrawal given long half-life of this med, no tremors but N/V, possible headaches (pt clutching head), confusion and hallucinations present. Would restart on klonopin with CIWA for now, can use zyprexa PRN if necessary for agitation. Also may consider delirium 2/2 meclizine use as patient has gotten some PRNs of this. Given history from family unlikely to be dementia as patient's condition appears to have started acutely. On mirtazapine at home but would hold off on restarting this for now. Discussed risks/benefits of above meds with family, amenable to use.     Na stabilizing per primary team. CT head wnl. Would f/u b12, folate, RPR.    7/22: Patient c/t have paranoia and perceptual disturbances, likely 2/2 delirium. 63 y/o Female with possible PPH of depression/anxiety, PMH of HTN, HLD, hyponatremia presents with abdominal discomfort which describes as burning/bloating sensation with associated nausea and no vomiting x 1 week. Psych c/s for new onset paranoia, AH/VH, confusion.     Patient exhibits delirium with waxing/waning paranoia/hallucinations/sensorium which may be 2/2 delayed clonazepam withdrawal given long half-life of this med, no tremors but N/V, possible headaches (pt clutching head), confusion and hallucinations present. Would restart on klonopin with CIWA for now, can use zyprexa PRN if necessary for agitation. Also may consider delirium 2/2 meclizine use as patient has gotten some PRNs of this. Given history from family unlikely to be dementia as patient's condition appears to have started acutely. On mirtazapine at home but would hold off on restarting this for now. Discussed risks/benefits of above meds with family, amenable to use.     Na stabilizing per primary team. CT head wnl. Would f/u b12, folate, RPR.    7/22: Patient c/t have paranoia and perceptual disturbances, likely 2/2 delirium due to possibly benzo. withdrawal vs [facility]

## 2019-07-22 NOTE — PROGRESS NOTE BEHAVIORAL HEALTH - NSBHFUPINTERVALHXFT_PSY_A_CORE
[place]  ID [id number]      Pt received Zyprexa 1.25 mg PO PRN agitation yesterday evening. Pt scored 2~4 on CIWA for agitation/anxiety. On evaluation today patient is calm, alert, oriented to self and [facility]. She does not know date. Pt c/o burning upper abdominal pain and wanting to get home as soon as possible. Per son-in-law at bedside, patient has been voicing paranoia about medication she has been receiving and possible hallucinations. Pt states that she does not like hospital food because it is bad for her cholesterol. Pt denies SI/HI.

## 2019-07-22 NOTE — PROGRESS NOTE ADULT - SUBJECTIVE AND OBJECTIVE BOX
Patient is a 64y old  Female who presents with a chief complaint of Chest pressure (22 Jul 2019 08:43)        SUBJECTIVE / OVERNIGHT EVENTS: Pt notes feeling somewhat better though still with abd discomfort. Pt unwilling to talk to examiner; history obtained from family at bedside. Per family, pt appears confused and anxious.      MEDICATIONS  (STANDING):  aspirin enteric coated 81 milliGRAM(s) Oral daily  clonazePAM  Tablet 1 milliGRAM(s) Oral two times a day  docusate sodium 100 milliGRAM(s) Oral three times a day  fenofibrate Tablet 145 milliGRAM(s) Oral daily  folic acid 1 milliGRAM(s) Oral daily  heparin  Injectable 5000 Unit(s) SubCutaneous every 12 hours  nebivolol 2.5 milliGRAM(s) Oral daily  pantoprazole  Injectable 40 milliGRAM(s) IV Push daily  polyethylene glycol 3350 17 Gram(s) Oral daily  senna 2 Tablet(s) Oral at bedtime  sodium chloride 0.9% lock flush 3 milliLiter(s) IV Push every 8 hours  sodium chloride 0.9%. 1000 milliLiter(s) (50 mL/Hr) IV Continuous <Continuous>    MEDICATIONS  (PRN):  hyoscyamine SL 0.125 milliGRAM(s) SubLingual every 4 hours PRN abd pain  LORazepam     Tablet 2 milliGRAM(s) Oral every 2 hours PRN Symptom-triggered 2 point increase in CIWA-Ar  meclizine 25 milliGRAM(s) Oral three times a day PRN Dizziness  OLANZapine 1.25 milliGRAM(s) Oral every 6 hours PRN agitation  OLANZapine Disintegrating Tablet 1.25 milliGRAM(s) Oral every 6 hours PRN agitation  OLANZapine Injectable 1.25 milliGRAM(s) IntraMuscular every 6 hours PRN agitation  ondansetron Injectable 4 milliGRAM(s) IV Push every 6 hours PRN Nausea and/or Vomiting  zolpidem 5 milliGRAM(s) Oral at bedtime PRN Insomnia      Vital Signs Last 24 Hrs  T(C): 36.7 (22 Jul 2019 08:02), Max: 36.8 (21 Jul 2019 11:31)  T(F): 98.1 (22 Jul 2019 08:02), Max: 98.3 (21 Jul 2019 18:50)  HR: 98 (22 Jul 2019 08:02) (63 - 113)  BP: 131/89 (22 Jul 2019 08:02) (125/63 - 151/96)  BP(mean): --  RR: 16 (22 Jul 2019 08:02) (16 - 18)  SpO2: 100% (22 Jul 2019 08:02) (98% - 100%)  CAPILLARY BLOOD GLUCOSE        I&O's Summary        PHYSICAL EXAM  GENERAL: anxious, NAD  HEAD:  Atraumatic, Normocephalic  EYES: EOMI, conjunctiva and sclera clear  EXTREMITIES:  No clubbing, cyanosis, or edema  PSYCH: oriented to name; not answering additional orientation questions  SKIN: abrasion on left anterior leg    LABS:                        11.0   9.67  )-----------( 336      ( 22 Jul 2019 08:22 )             33.9     07-22    132<L>  |  97<L>  |  9   ----------------------------<  112<H>  3.9   |  21<L>  |  0.72    Ca    9.3      22 Jul 2019 08:22  Phos  4.2     07-22  Mg     2.0     07-22    TPro  8.5<H>  /  Alb  4.6  /  TBili  0.6  /  DBili  x   /  AST  28  /  ALT  25  /  AlkPhos  74  07-21              Culture - Urine (collected 20 Jul 2019 05:47)  Source: URINE MIDSTREAM  Final Report (21 Jul 2019 05:50):    NO GROWTH AT 24 HOURS        RADIOLOGY & ADDITIONAL TESTS:    Imaging Personally Reviewed:  Consultant(s) Notes Reviewed:    Care Discussed with Consultants/Other Providers:

## 2019-07-22 NOTE — PROGRESS NOTE BEHAVIORAL HEALTH - NSBHCONSULTMEDAGITATION_PSY_A_CORE FT
Zypreza 1.25mg q6h PO/Zydis/IM PRN agitation     ***Avoid giving Zyprexa IM within 3 hours of Ativan IM to avoid risk of respiratory depression*** Zypreza 1.25mg q6h PO/Zydis/IM PRN agitation     *************DO NOT GIVE IM Zyprexa within 4hrs of IV/IM Ativan due to risk of sedation and respiratory depression*********** Zypreza 1.25mg q6h PO/Zydis/IM PRN agitation   *************DO NOT GIVE IM Zyprexa within 4hrs of IV/IM Ativan due to risk of sedation and respiratory depression***********

## 2019-07-22 NOTE — PROGRESS NOTE ADULT - SUBJECTIVE AND OBJECTIVE BOX
Chief Complaint:  Patient is a 64y old  Female who presents with a chief complaint of Chest pressure (21 Jul 2019 10:52)      Interval Events: Pt continues to have abdominal pain, Na 126 yesterday, pending today's lab.   ROS: All 12 point system except listed above were otherwise negative.    Allergies:  No Known Allergies        Hospital Medications:  aspirin enteric coated 81 milliGRAM(s) Oral daily  clonazePAM  Tablet 1 milliGRAM(s) Oral two times a day  docusate sodium 100 milliGRAM(s) Oral three times a day  fenofibrate Tablet 145 milliGRAM(s) Oral daily  folic acid 1 milliGRAM(s) Oral daily  heparin  Injectable 5000 Unit(s) SubCutaneous every 12 hours  hyoscyamine SL 0.125 milliGRAM(s) SubLingual every 4 hours PRN  LORazepam     Tablet 2 milliGRAM(s) Oral every 2 hours PRN  meclizine 25 milliGRAM(s) Oral three times a day PRN  nebivolol 2.5 milliGRAM(s) Oral daily  OLANZapine 1.25 milliGRAM(s) Oral every 6 hours PRN  OLANZapine Disintegrating Tablet 1.25 milliGRAM(s) Oral every 6 hours PRN  OLANZapine Injectable 1.25 milliGRAM(s) IntraMuscular every 6 hours PRN  ondansetron Injectable 4 milliGRAM(s) IV Push every 6 hours PRN  pantoprazole  Injectable 40 milliGRAM(s) IV Push daily  polyethylene glycol 3350 17 Gram(s) Oral daily  senna 2 Tablet(s) Oral at bedtime  sodium chloride 0.9% lock flush 3 milliLiter(s) IV Push every 8 hours  sodium chloride 0.9%. 1000 milliLiter(s) IV Continuous <Continuous>  zolpidem 5 milliGRAM(s) Oral at bedtime PRN      PMHX/PSHX:  Hyponatremia  Hyperlipemia  Hypertension  No significant past surgical history      Family history:  No pertinent family history in first degree relatives    There is no family history of peptic ulcer disease, gastric cancer, colon polyps, colon cancer, celiac disease, biliary, hepatic, or pancreatic disease.  None of the female relatives have breast, uterine, or ovarian cancer.     PHYSICAL EXAM:   Vital Signs:  Vital Signs Last 24 Hrs  T(C): 36.8 (22 Jul 2019 06:11), Max: 36.8 (21 Jul 2019 09:49)  T(F): 98.2 (22 Jul 2019 06:11), Max: 98.3 (21 Jul 2019 09:49)  HR: 84 (22 Jul 2019 06:11) (63 - 113)  BP: 129/88 (22 Jul 2019 06:11) (125/63 - 151/96)  BP(mean): --  RR: 16 (22 Jul 2019 06:11) (16 - 19)  SpO2: 99% (22 Jul 2019 06:11) (98% - 100%)  Daily     Daily     PHYSICAL EXAM:     GENERAL:  Appears stated age, well-groomed  HEENT:  NC/AT,  conjunctivae clear and pink  CHEST:  Full & symmetric excursion, no increased effort, breath sounds clear  HEART:  Regular rhythm, S1, S2  ABDOMEN:  Soft, non-tender, non-distended  EXTEREMITIES:  no cyanosis,clubbing or edema  SKIN:  No rash/erythema/ecchymoses      LABS:                        11.0   9.67  )-----------( 336      ( 22 Jul 2019 08:22 )             33.9     Mean Cell Volume: 80.9 fL (07-22-19 @ 08:22)    07-21    126<L>  |  89<L>  |  10  ----------------------------<  166<H>  3.5   |  19<L>  |  0.72    Ca    9.4      21 Jul 2019 11:36  Phos  4.3     07-21  Mg     1.8     07-21    TPro  8.5<H>  /  Alb  4.6  /  TBili  0.6  /  DBili  x   /  AST  28  /  ALT  25  /  AlkPhos  74  07-21    LIVER FUNCTIONS - ( 21 Jul 2019 11:36 )  Alb: 4.6 g/dL / Pro: 8.5 g/dL / ALK PHOS: 74 u/L / ALT: 25 u/L / AST: 28 u/L / GGT: x                                       11.0   9.67  )-----------( 336      ( 22 Jul 2019 08:22 )             33.9                         11.8   12.32 )-----------( 436      ( 21 Jul 2019 11:36 )             35.9                         11.1   8.59  )-----------( 301      ( 20 Jul 2019 11:30 )             33.7                         10.7   9.45  )-----------( 307      ( 20 Jul 2019 02:52 )             33.3                         11.5   9.81  )-----------( 335      ( 19 Jul 2019 19:50 )             35.6     Imaging: Chief Complaint:  Patient is a 64y old  Female who presents with a chief complaint of Chest pressure (21 Jul 2019 10:52)      Interval Events: Pt continues to have abdominal pain.  ROS: All 12 point system except listed above were otherwise negative.    Allergies:  No Known Allergies        Hospital Medications:  aspirin enteric coated 81 milliGRAM(s) Oral daily  clonazePAM  Tablet 1 milliGRAM(s) Oral two times a day  docusate sodium 100 milliGRAM(s) Oral three times a day  fenofibrate Tablet 145 milliGRAM(s) Oral daily  folic acid 1 milliGRAM(s) Oral daily  heparin  Injectable 5000 Unit(s) SubCutaneous every 12 hours  hyoscyamine SL 0.125 milliGRAM(s) SubLingual every 4 hours PRN  LORazepam     Tablet 2 milliGRAM(s) Oral every 2 hours PRN  meclizine 25 milliGRAM(s) Oral three times a day PRN  nebivolol 2.5 milliGRAM(s) Oral daily  OLANZapine 1.25 milliGRAM(s) Oral every 6 hours PRN  OLANZapine Disintegrating Tablet 1.25 milliGRAM(s) Oral every 6 hours PRN  OLANZapine Injectable 1.25 milliGRAM(s) IntraMuscular every 6 hours PRN  ondansetron Injectable 4 milliGRAM(s) IV Push every 6 hours PRN  pantoprazole  Injectable 40 milliGRAM(s) IV Push daily  polyethylene glycol 3350 17 Gram(s) Oral daily  senna 2 Tablet(s) Oral at bedtime  sodium chloride 0.9% lock flush 3 milliLiter(s) IV Push every 8 hours  sodium chloride 0.9%. 1000 milliLiter(s) IV Continuous <Continuous>  zolpidem 5 milliGRAM(s) Oral at bedtime PRN      PMHX/PSHX:  Hyponatremia  Hyperlipemia  Hypertension  No significant past surgical history      Family history:  No pertinent family history in first degree relatives    There is no family history of peptic ulcer disease, gastric cancer, colon polyps, colon cancer, celiac disease, biliary, hepatic, or pancreatic disease.  None of the female relatives have breast, uterine, or ovarian cancer.     PHYSICAL EXAM:   Vital Signs:  Vital Signs Last 24 Hrs  T(C): 36.8 (22 Jul 2019 06:11), Max: 36.8 (21 Jul 2019 09:49)  T(F): 98.2 (22 Jul 2019 06:11), Max: 98.3 (21 Jul 2019 09:49)  HR: 84 (22 Jul 2019 06:11) (63 - 113)  BP: 129/88 (22 Jul 2019 06:11) (125/63 - 151/96)  BP(mean): --  RR: 16 (22 Jul 2019 06:11) (16 - 19)  SpO2: 99% (22 Jul 2019 06:11) (98% - 100%)  Daily     Daily     PHYSICAL EXAM:     GENERAL:  Appears stated age, well-groomed  HEENT:  NC/AT,  conjunctivae clear and pink  CHEST:  Full & symmetric excursion, no increased effort, breath sounds clear  HEART:  Regular rhythm, S1, S2  ABDOMEN:  Soft, non-tender, non-distended  EXTEREMITIES:  no cyanosis,clubbing or edema  SKIN:  No rash/erythema/ecchymoses      LABS:                        11.0   9.67  )-----------( 336      ( 22 Jul 2019 08:22 )             33.9     Mean Cell Volume: 80.9 fL (07-22-19 @ 08:22)    07-21    126<L>  |  89<L>  |  10  ----------------------------<  166<H>  3.5   |  19<L>  |  0.72    Ca    9.4      21 Jul 2019 11:36  Phos  4.3     07-21  Mg     1.8     07-21    TPro  8.5<H>  /  Alb  4.6  /  TBili  0.6  /  DBili  x   /  AST  28  /  ALT  25  /  AlkPhos  74  07-21    LIVER FUNCTIONS - ( 21 Jul 2019 11:36 )  Alb: 4.6 g/dL / Pro: 8.5 g/dL / ALK PHOS: 74 u/L / ALT: 25 u/L / AST: 28 u/L / GGT: x                                       11.0   9.67  )-----------( 336      ( 22 Jul 2019 08:22 )             33.9                         11.8   12.32 )-----------( 436      ( 21 Jul 2019 11:36 )             35.9                         11.1   8.59  )-----------( 301      ( 20 Jul 2019 11:30 )             33.7                         10.7   9.45  )-----------( 307      ( 20 Jul 2019 02:52 )             33.3                         11.5   9.81  )-----------( 335      ( 19 Jul 2019 19:50 )             35.6     Imaging:

## 2019-07-23 LAB
ANION GAP SERPL CALC-SCNC: 13 MMO/L — SIGNIFICANT CHANGE UP (ref 7–14)
APTT BLD: 32.3 SEC — SIGNIFICANT CHANGE UP (ref 27.5–36.3)
BUN SERPL-MCNC: 10 MG/DL — SIGNIFICANT CHANGE UP (ref 7–23)
CALCIUM SERPL-MCNC: 9.3 MG/DL — SIGNIFICANT CHANGE UP (ref 8.4–10.5)
CHLORIDE SERPL-SCNC: 99 MMOL/L — SIGNIFICANT CHANGE UP (ref 98–107)
CHLORIDE UR-SCNC: 61 MMOL/L — SIGNIFICANT CHANGE UP
CO2 SERPL-SCNC: 21 MMOL/L — LOW (ref 22–31)
CREAT SERPL-MCNC: 0.69 MG/DL — SIGNIFICANT CHANGE UP (ref 0.5–1.3)
GLUCOSE SERPL-MCNC: 97 MG/DL — SIGNIFICANT CHANGE UP (ref 70–99)
HCG UR-SCNC: NEGATIVE — SIGNIFICANT CHANGE UP
HCT VFR BLD CALC: 33.5 % — LOW (ref 34.5–45)
HGB BLD-MCNC: 10.5 G/DL — LOW (ref 11.5–15.5)
INR BLD: 1.07 — SIGNIFICANT CHANGE UP (ref 0.88–1.17)
MCHC RBC-ENTMCNC: 26.3 PG — LOW (ref 27–34)
MCHC RBC-ENTMCNC: 31.3 % — LOW (ref 32–36)
MCV RBC AUTO: 83.8 FL — SIGNIFICANT CHANGE UP (ref 80–100)
NRBC # FLD: 0 K/UL — SIGNIFICANT CHANGE UP (ref 0–0)
OSMOLALITY SERPL: 270 MOSMO/KG — LOW (ref 275–295)
OSMOLALITY UR: 262 MOSMO/KG — SIGNIFICANT CHANGE UP (ref 50–1200)
PLATELET # BLD AUTO: 308 K/UL — SIGNIFICANT CHANGE UP (ref 150–400)
PMV BLD: 9.2 FL — SIGNIFICANT CHANGE UP (ref 7–13)
POTASSIUM SERPL-MCNC: 4.1 MMOL/L — SIGNIFICANT CHANGE UP (ref 3.5–5.3)
POTASSIUM SERPL-SCNC: 4.1 MMOL/L — SIGNIFICANT CHANGE UP (ref 3.5–5.3)
POTASSIUM UR-SCNC: 12.6 MMOL/L — SIGNIFICANT CHANGE UP
PROTHROM AB SERPL-ACNC: 12.2 SEC — SIGNIFICANT CHANGE UP (ref 9.8–13.1)
RBC # BLD: 4 M/UL — SIGNIFICANT CHANGE UP (ref 3.8–5.2)
RBC # FLD: 14 % — SIGNIFICANT CHANGE UP (ref 10.3–14.5)
SODIUM SERPL-SCNC: 133 MMOL/L — LOW (ref 135–145)
SODIUM UR-SCNC: 62 MMOL/L — SIGNIFICANT CHANGE UP
SP GR UR: 1.01 — SIGNIFICANT CHANGE UP (ref 1–1.03)
WBC # BLD: 8.29 K/UL — SIGNIFICANT CHANGE UP (ref 3.8–10.5)
WBC # FLD AUTO: 8.29 K/UL — SIGNIFICANT CHANGE UP (ref 3.8–10.5)

## 2019-07-23 PROCEDURE — 88312 SPECIAL STAINS GROUP 1: CPT | Mod: 26

## 2019-07-23 PROCEDURE — 43239 EGD BIOPSY SINGLE/MULTIPLE: CPT | Mod: GC

## 2019-07-23 PROCEDURE — 99232 SBSQ HOSP IP/OBS MODERATE 35: CPT

## 2019-07-23 PROCEDURE — 88305 TISSUE EXAM BY PATHOLOGIST: CPT | Mod: 26

## 2019-07-23 PROCEDURE — 99233 SBSQ HOSP IP/OBS HIGH 50: CPT

## 2019-07-23 RX ADMIN — Medication 100 MILLIGRAM(S): at 14:27

## 2019-07-23 RX ADMIN — Medication 1 MILLIGRAM(S): at 23:15

## 2019-07-23 RX ADMIN — SODIUM CHLORIDE 3 MILLILITER(S): 9 INJECTION INTRAMUSCULAR; INTRAVENOUS; SUBCUTANEOUS at 23:15

## 2019-07-23 RX ADMIN — Medication 145 MILLIGRAM(S): at 14:27

## 2019-07-23 RX ADMIN — Medication 1 MILLIGRAM(S): at 14:27

## 2019-07-23 RX ADMIN — HEPARIN SODIUM 5000 UNIT(S): 5000 INJECTION INTRAVENOUS; SUBCUTANEOUS at 17:50

## 2019-07-23 RX ADMIN — Medication 1 TABLET(S): at 17:50

## 2019-07-23 RX ADMIN — PANTOPRAZOLE SODIUM 40 MILLIGRAM(S): 20 TABLET, DELAYED RELEASE ORAL at 14:28

## 2019-07-23 RX ADMIN — Medication 81 MILLIGRAM(S): at 14:26

## 2019-07-23 RX ADMIN — NEBIVOLOL HYDROCHLORIDE 2.5 MILLIGRAM(S): 5 TABLET ORAL at 14:28

## 2019-07-23 RX ADMIN — ZOLPIDEM TARTRATE 5 MILLIGRAM(S): 10 TABLET ORAL at 23:55

## 2019-07-23 RX ADMIN — SODIUM CHLORIDE 3 MILLILITER(S): 9 INJECTION INTRAMUSCULAR; INTRAVENOUS; SUBCUTANEOUS at 05:55

## 2019-07-23 RX ADMIN — Medication 1 MILLIGRAM(S): at 14:26

## 2019-07-23 RX ADMIN — SODIUM CHLORIDE 3 MILLILITER(S): 9 INJECTION INTRAMUSCULAR; INTRAVENOUS; SUBCUTANEOUS at 13:52

## 2019-07-23 NOTE — PROGRESS NOTE ADULT - PROBLEM SELECTOR PLAN 6
Pt is psychotic, ? contributing to her current presentation. Pt refused interview with [facility] . R/o underlying Psychiatric issues.  -1:1 for safety until cleared by Psych consult  -Psych consult.

## 2019-07-23 NOTE — PROGRESS NOTE BEHAVIORAL HEALTH - RISK ASSESSMENT
risk for harm due to confusion and delirium  Will continue to follow up risk for harm due to confusion and delirium, some paranoia  Protective factors; Denies SI and HI, supportive family, future oriented, Mandaeism  Will continue to follow up

## 2019-07-23 NOTE — PROGRESS NOTE BEHAVIORAL HEALTH - SUMMARY
65 y/o Female with possible PPH of depression/anxiety, PMH of HTN, HLD, hyponatremia presents with abdominal discomfort which describes as burning/bloating sensation with associated nausea and no vomiting x 1 week. Psych c/s for new onset paranoia, AH/VH, confusion.     Patient exhibits delirium with waxing/waning paranoia/hallucinations/sensorium which may be 2/2 delayed clonazepam withdrawal given long half-life of this med, no tremors but N/V, possible headaches (pt clutching head), confusion and hallucinations present. Would restart on klonopin with CIWA for now, can use zyprexa PRN if necessary for agitation. Also may consider delirium 2/2 meclizine use as patient has gotten some PRNs of this. Given history from family unlikely to be dementia as patient's condition appears to have started acutely. On mirtazapine at home but would hold off on restarting this for now. Discussed risks/benefits of above meds with family, amenable to use.     Na stabilizing per primary team. CT head wnl. Would f/u b12, folate, RPR.    7/22: Patient c/t have paranoia and perceptual disturbances, likely 2/2 delirium due to possibly benzo. withdrawal vs C    7/23: Patient with improving delirium overall. 65 y/o Female with possible PPH of depression/anxiety, PMH of HTN, HLD, hyponatremia presents with abdominal discomfort which describes as burning/bloating sensation with associated nausea and no vomiting x 1 week. Psych c/s for new onset paranoia, AH/VH, confusion.     Patient exhibits delirium with waxing/waning paranoia/hallucinations/sensorium which may be 2/2 delayed clonazepam withdrawal given long half-life of this med, no tremors but N/V, possible headaches (pt clutching head), confusion and hallucinations present. Would restart on klonopin with CIWA for now, can use zyprexa PRN if necessary for agitation. Also may consider delirium 2/2 meclizine use as patient has gotten some PRNs of this. Given history from family unlikely to be dementia as patient's condition appears to have started acutely. On mirtazapine at home but would hold off on restarting this for now. Discussed risks/benefits of above meds with family, amenable to use.     Na stabilizing per primary team. CT head wnl. Would f/u b12, folate, RPR.    7/22: Patient c/t have paranoia and perceptual disturbances, likely 2/2 delirium due to possibly benzo. withdrawal vs C    7/23: Patient with improving delirium overall with less paranoia. C/t have perceptual disturbances. 63 y/o Female with possible PPH of depression/anxiety, PMH of HTN, HLD, hyponatremia presents with abdominal discomfort which describes as burning/bloating sensation with associated nausea and no vomiting x 1 week. Psych c/s for new onset paranoia, AH/VH, confusion.     Patient exhibits delirium with waxing/waning paranoia/hallucinations/sensorium which may be 2/2 delayed clonazepam withdrawal given long half-life of this med, no tremors but N/V, possible headaches (pt clutching head), confusion and hallucinations present. Would restart on klonopin with CIWA for now, can use zyprexa PRN if necessary for agitation. Also may consider delirium 2/2 meclizine use as patient has gotten some PRNs of this. Given history from family unlikely to be dementia as patient's condition appears to have started acutely. On mirtazapine at home but would hold off on restarting this for now. Discussed risks/benefits of above meds with family, amenable to use.     Na stabilizing per primary team. CT head wnl. Would f/u b12, folate, RPR.    7/22: Patient c/t have paranoia and perceptual disturbances, likely 2/2 delirium due to possibly benzo. withdrawal vs C    7/23: Patient with improving delirium overall with less paranoia. C/t have some perceptual disturbances.

## 2019-07-23 NOTE — PROGRESS NOTE BEHAVIORAL HEALTH - NSBHFUPINTERVALHXFT_PSY_A_CORE
No PRNs received overnight. Pt scored 3 > 2 > 0 > 1 on CIWA for anxiety. On evaluation today patient is seen with grandson at bedside. Patient is calm, alert, oriented to self and [facility]. She does not know date. Per grandson, patient does not know how to say the date at baseline. Pt states that pain has resolved and she wants to go home today. Patient feels safe in the hospital. She continues to have AH of voices telling her she should go home but states that they are diminishing. She denies SI/HI/VH. No PRNs received overnight. Pt scored 3 > 2 > 0 > 1 on CIWA for anxiety. On evaluation today patient is seen with grandson at bedside. Patient is calm, alert, oriented to self and [facility]. She does not know date. Per grandson, patient does not know how to say the date at baseline. Pt states that pain has resolved and she wants to go home today. Patient feels safe in the hospital. She continues to have AH of voices telling her she should go home but states that they are diminishing. Denies CAH. She denies SI/HI/VH. No PRNs received overnight. Pt scored 3 > 2 > 0 > 1 on CIWA for anxiety. On evaluation today patient is seen with grandson at bedside. Patient is calm, alert, oriented to self and [facility]. She does not know date. Per grandson, patient does not know how to say the date at baseline. Pt states that pain has resolved and she wants to go home today. Patient feels safe in the hospital. She continues to have AH of voices telling her she should go home but states that they are diminishing. Denies CAH. She denies SI/HI/VH.    Tried calling on listed number Mr. Demetrio Pierre [phone], unable to reach, awaiting call back

## 2019-07-23 NOTE — PROGRESS NOTE ADULT - SUBJECTIVE AND OBJECTIVE BOX
Patient is a 64y old  Female who presents with a chief complaint of Chest pressure (22 Jul 2019 11:03)        SUBJECTIVE / OVERNIGHT EVENTS: No overnight event. No complaint this morning.      MEDICATIONS  (STANDING):  aspirin enteric coated 81 milliGRAM(s) Oral daily  clonazePAM  Tablet 1 milliGRAM(s) Oral two times a day  docusate sodium 100 milliGRAM(s) Oral three times a day  fenofibrate Tablet 145 milliGRAM(s) Oral daily  folic acid 1 milliGRAM(s) Oral daily  heparin  Injectable 5000 Unit(s) SubCutaneous every 12 hours  nebivolol 2.5 milliGRAM(s) Oral daily  pantoprazole  Injectable 40 milliGRAM(s) IV Push daily  polyethylene glycol 3350 17 Gram(s) Oral daily  senna 2 Tablet(s) Oral at bedtime  sodium chloride 0.9% lock flush 3 milliLiter(s) IV Push every 8 hours  sodium chloride 0.9%. 1000 milliLiter(s) (50 mL/Hr) IV Continuous <Continuous>    MEDICATIONS  (PRN):  hyoscyamine SL 0.125 milliGRAM(s) SubLingual every 4 hours PRN abd pain  LORazepam     Tablet 2 milliGRAM(s) Oral every 2 hours PRN Symptom-triggered 2 point increase in CIWA-Ar  meclizine 25 milliGRAM(s) Oral three times a day PRN Dizziness  OLANZapine 1.25 milliGRAM(s) Oral every 6 hours PRN agitation  OLANZapine Disintegrating Tablet 1.25 milliGRAM(s) Oral every 6 hours PRN agitation  OLANZapine Injectable 1.25 milliGRAM(s) IntraMuscular every 6 hours PRN agitation  ondansetron Injectable 4 milliGRAM(s) IV Push every 6 hours PRN Nausea and/or Vomiting  zolpidem 5 milliGRAM(s) Oral at bedtime PRN Insomnia      Vital Signs Last 24 Hrs  T(C): 36.4 (23 Jul 2019 07:58), Max: 36.6 (22 Jul 2019 20:05)  T(F): 97.6 (23 Jul 2019 07:58), Max: 97.9 (23 Jul 2019 07:39)  HR: 81 (23 Jul 2019 07:58) (81 - 102)  BP: 117/76 (23 Jul 2019 07:58) (104/72 - 127/87)  BP(mean): --  RR: 17 (23 Jul 2019 07:58) (16 - 18)  SpO2: 96% (23 Jul 2019 07:58) (96% - 100%)  CAPILLARY BLOOD GLUCOSE        I&O's Summary        PHYSICAL EXAM  GENERAL: NAD, well-developed  HEAD:  Atraumatic, Normocephalic  EYES: EOMI, PERRLA, conjunctiva and sclera clear  CHEST/LUNG: Clear to auscultation bilaterally; No wheeze  HEART: Regular rate and rhythm; No murmurs, rubs, or gallops  ABDOMEN: Soft, Nontender, Nondistended; Bowel sounds present  EXTREMITIES:  2+ Peripheral Pulses, No clubbing, cyanosis, or edema      LABS:                        10.5   8.29  )-----------( 308      ( 23 Jul 2019 05:40 )             33.5     07-23    133<L>  |  99  |  10  ----------------------------<  97  4.1   |  21<L>  |  0.69    Ca    9.3      23 Jul 2019 05:40  Phos  4.2     07-22  Mg     2.0     07-22      PT/INR - ( 23 Jul 2019 05:40 )   PT: 12.2 SEC;   INR: 1.07          PTT - ( 23 Jul 2019 05:40 )  PTT:32.3 SEC            RADIOLOGY & ADDITIONAL TESTS:    Imaging Personally Reviewed:  Consultant(s) Notes Reviewed:    Care Discussed with Consultants/Other Providers:

## 2019-07-23 NOTE — PROGRESS NOTE BEHAVIORAL HEALTH - NSBHCONSULTMEDAGITATION_PSY_A_CORE FT
Zypreza 1.25mg q6h PO/Zydis/IM PRN agitation   *************DO NOT GIVE IM Zyprexa within 4hrs of IV/IM Ativan due to risk of sedation and respiratory depression***********

## 2019-07-24 VITALS
RESPIRATION RATE: 18 BRPM | HEART RATE: 69 BPM | SYSTOLIC BLOOD PRESSURE: 111 MMHG | TEMPERATURE: 98 F | OXYGEN SATURATION: 99 % | DIASTOLIC BLOOD PRESSURE: 74 MMHG

## 2019-07-24 LAB
ANION GAP SERPL CALC-SCNC: 14 MMO/L — SIGNIFICANT CHANGE UP (ref 7–14)
BUN SERPL-MCNC: 13 MG/DL — SIGNIFICANT CHANGE UP (ref 7–23)
CALCIUM SERPL-MCNC: 9.3 MG/DL — SIGNIFICANT CHANGE UP (ref 8.4–10.5)
CHLORIDE SERPL-SCNC: 96 MMOL/L — LOW (ref 98–107)
CO2 SERPL-SCNC: 21 MMOL/L — LOW (ref 22–31)
CREAT SERPL-MCNC: 0.71 MG/DL — SIGNIFICANT CHANGE UP (ref 0.5–1.3)
GLUCOSE SERPL-MCNC: 95 MG/DL — SIGNIFICANT CHANGE UP (ref 70–99)
HCT VFR BLD CALC: 32.9 % — LOW (ref 34.5–45)
HGB BLD-MCNC: 10.6 G/DL — LOW (ref 11.5–15.5)
MCHC RBC-ENTMCNC: 26.8 PG — LOW (ref 27–34)
MCHC RBC-ENTMCNC: 32.2 % — SIGNIFICANT CHANGE UP (ref 32–36)
MCV RBC AUTO: 83.1 FL — SIGNIFICANT CHANGE UP (ref 80–100)
NRBC # FLD: 0 K/UL — SIGNIFICANT CHANGE UP (ref 0–0)
PLATELET # BLD AUTO: 311 K/UL — SIGNIFICANT CHANGE UP (ref 150–400)
PMV BLD: 8.9 FL — SIGNIFICANT CHANGE UP (ref 7–13)
POTASSIUM SERPL-MCNC: 3.6 MMOL/L — SIGNIFICANT CHANGE UP (ref 3.5–5.3)
POTASSIUM SERPL-SCNC: 3.6 MMOL/L — SIGNIFICANT CHANGE UP (ref 3.5–5.3)
RBC # BLD: 3.96 M/UL — SIGNIFICANT CHANGE UP (ref 3.8–5.2)
RBC # FLD: 14 % — SIGNIFICANT CHANGE UP (ref 10.3–14.5)
SODIUM SERPL-SCNC: 131 MMOL/L — LOW (ref 135–145)
WBC # BLD: 7.76 K/UL — SIGNIFICANT CHANGE UP (ref 3.8–10.5)
WBC # FLD AUTO: 7.76 K/UL — SIGNIFICANT CHANGE UP (ref 3.8–10.5)

## 2019-07-24 PROCEDURE — 99239 HOSP IP/OBS DSCHRG MGMT >30: CPT

## 2019-07-24 PROCEDURE — 99233 SBSQ HOSP IP/OBS HIGH 50: CPT

## 2019-07-24 PROCEDURE — 99232 SBSQ HOSP IP/OBS MODERATE 35: CPT | Mod: GC

## 2019-07-24 RX ORDER — CLONAZEPAM 1 MG
1 TABLET ORAL
Qty: 0 | Refills: 0 | DISCHARGE
Start: 2019-07-24

## 2019-07-24 RX ORDER — PANTOPRAZOLE SODIUM 20 MG/1
1 TABLET, DELAYED RELEASE ORAL
Qty: 30 | Refills: 0
Start: 2019-07-24 | End: 2019-08-22

## 2019-07-24 RX ORDER — PANTOPRAZOLE SODIUM 20 MG/1
40 TABLET, DELAYED RELEASE ORAL
Qty: 0 | Refills: 0 | DISCHARGE
Start: 2019-07-24

## 2019-07-24 RX ORDER — CLONAZEPAM 1 MG
1 TABLET ORAL
Qty: 20 | Refills: 0
Start: 2019-07-24 | End: 2019-08-02

## 2019-07-24 RX ADMIN — SODIUM CHLORIDE 3 MILLILITER(S): 9 INJECTION INTRAMUSCULAR; INTRAVENOUS; SUBCUTANEOUS at 05:36

## 2019-07-24 RX ADMIN — NEBIVOLOL HYDROCHLORIDE 2.5 MILLIGRAM(S): 5 TABLET ORAL at 05:31

## 2019-07-24 RX ADMIN — Medication 1 MILLIGRAM(S): at 05:31

## 2019-07-24 RX ADMIN — SODIUM CHLORIDE 3 MILLILITER(S): 9 INJECTION INTRAMUSCULAR; INTRAVENOUS; SUBCUTANEOUS at 11:43

## 2019-07-24 NOTE — PROGRESS NOTE ADULT - PROBLEM SELECTOR PLAN 6
Pt is psychotic, ? contributing to her current presentation. Pt refused interview with [facility] . R/o underlying Psychiatric issues.  -Appreciate Psych recs

## 2019-07-24 NOTE — PROGRESS NOTE BEHAVIORAL HEALTH - NSBHCONSULTFOLLOWAFTERCARE_PSY_A_CORE FT
[facility] outpt. clinic: [phone]   [facility] outpt. walk in clinic : [phone] (9AM-7PM) [facility]: [phone] , tried calling to make an appointment but [facility] takes cases age 65 and above.  Left a message for GRACE VASQUEZ.  Awaiting call back.  Please provide following referrals:  [facility] : [phone] (9AM-7PM)  [facility] outpt clinic [phone]

## 2019-07-24 NOTE — PROGRESS NOTE ADULT - SUBJECTIVE AND OBJECTIVE BOX
Patient is a 64y old  Female who presents with a chief complaint of Chest pressure (24 Jul 2019 10:27)        SUBJECTIVE / OVERNIGHT EVENTS: No overnight event. No complaint this morning.      MEDICATIONS  (STANDING):  aspirin enteric coated 81 milliGRAM(s) Oral daily  clonazePAM  Tablet 1 milliGRAM(s) Oral two times a day  docusate sodium 100 milliGRAM(s) Oral three times a day  fenofibrate Tablet 145 milliGRAM(s) Oral daily  folic acid 1 milliGRAM(s) Oral daily  heparin  Injectable 5000 Unit(s) SubCutaneous every 12 hours  multivitamin 1 Tablet(s) Oral daily  nebivolol 2.5 milliGRAM(s) Oral daily  pantoprazole  Injectable 40 milliGRAM(s) IV Push daily  polyethylene glycol 3350 17 Gram(s) Oral daily  senna 2 Tablet(s) Oral at bedtime  sodium chloride 0.9% lock flush 3 milliLiter(s) IV Push every 8 hours  sodium chloride 0.9%. 1000 milliLiter(s) (50 mL/Hr) IV Continuous <Continuous>    MEDICATIONS  (PRN):  hyoscyamine SL 0.125 milliGRAM(s) SubLingual every 4 hours PRN abd pain  LORazepam     Tablet 2 milliGRAM(s) Oral every 2 hours PRN Symptom-triggered 2 point increase in CIWA-Ar  meclizine 25 milliGRAM(s) Oral three times a day PRN Dizziness  OLANZapine 1.25 milliGRAM(s) Oral every 6 hours PRN agitation  OLANZapine Disintegrating Tablet 1.25 milliGRAM(s) Oral every 6 hours PRN agitation  OLANZapine Injectable 1.25 milliGRAM(s) IntraMuscular every 6 hours PRN agitation  ondansetron Injectable 4 milliGRAM(s) IV Push every 6 hours PRN Nausea and/or Vomiting  zolpidem 5 milliGRAM(s) Oral at bedtime PRN Insomnia      Vital Signs Last 24 Hrs  T(C): 37.2 (24 Jul 2019 05:29), Max: 37.2 (24 Jul 2019 05:29)  T(F): 98.9 (24 Jul 2019 05:29), Max: 98.9 (24 Jul 2019 05:29)  HR: 93 (24 Jul 2019 05:29) (76 - 96)  BP: 105/68 (24 Jul 2019 05:29) (104/62 - 137/84)  BP(mean): --  RR: 17 (24 Jul 2019 05:29) (17 - 18)  SpO2: 100% (24 Jul 2019 05:29) (97% - 100%)  CAPILLARY BLOOD GLUCOSE        I&O's Summary        PHYSICAL EXAM  GENERAL: NAD, well-developed  HEAD:  Atraumatic, Normocephalic  EYES: EOMI, PERRLA, conjunctiva and sclera clear  CHEST/LUNG: Clear to auscultation bilaterally; No wheeze  HEART: Regular rate and rhythm; No murmurs, rubs, or gallops  ABDOMEN: Soft, Nontender, Nondistended; Bowel sounds present  EXTREMITIES:  2+ Peripheral Pulses, No clubbing, cyanosis, or edema    LABS:                        10.6   7.76  )-----------( 311      ( 24 Jul 2019 06:30 )             32.9     07-24    131<L>  |  96<L>  |  13  ----------------------------<  95  3.6   |  21<L>  |  0.71    Ca    9.3      24 Jul 2019 06:30      PT/INR - ( 23 Jul 2019 05:40 )   PT: 12.2 SEC;   INR: 1.07          PTT - ( 23 Jul 2019 05:40 )  PTT:32.3 SEC            RADIOLOGY & ADDITIONAL TESTS:    Imaging Personally Reviewed:  Consultant(s) Notes Reviewed:    Care Discussed with Consultants/Other Providers:

## 2019-07-24 NOTE — DISCHARGE NOTE PROVIDER - HOSPITAL COURSE
HPI:    65 y/o Female with hx of HTN, HLD, hyponatremia presents with abdominal discomfort which describes as burning/bloating sensation with associated nausea and no vomiting x 1 week. The symptoms are intermittent, but can be intense at times and also associated with midsternal chest discomfort, non radiating x 1 week. Chest pain occurs with food. She states she also gets dizzy and get SOB when the abdominal discomfort is terrible. Denies fever, cough, falls, LOC, PINON, vomiting, diarrhea, constipation, melena, hematochezia, LE edema or dysuria. Of note, the pt has difficulty providing specifics and details of her symptoms;         Of note: patient presented [facility] x 1 weeks for similar complaints, but was discharged home. She was then admitted last Wednesday and found to have hyponatremia. She was dc'ed on Friday and returned to ED (20 Jul 2019 05:04)        On admission:        Evaluated by GI: Abdominal discomfort- likely due to gastric ulcer and duodenitis, rule out H pylori. Hyponatremia- chronic    S/P EGD 7/23: Normal esophagus. Non-bleeding gastric ulcer with no stigmata of bleeding. Biopsied. Duodenitis. Biopsied.    GI recommended avoid NSAIDS & take PPI qd.  Patient to f/u with GI as outpatient for biopsy/pathology results.  Will need treatment for H.Pylori as outpatient if biopsy positive. No further inpatient GI w/u.  Resume usual diet.  F/U in GI clinic as outpatient. HPI:    65 y/o Female with hx of HTN, HLD, hyponatremia presents with abdominal discomfort which describes as burning/bloating sensation with associated nausea and no vomiting x 1 week. The symptoms are intermittent, but can be intense at times and also associated with midsternal chest discomfort, non radiating x 1 week. Chest pain occurs with food. She states she also gets dizzy and get SOB when the abdominal discomfort is terrible. Denies fever, cough, falls, LOC, PINON, vomiting, diarrhea, constipation, melena, hematochezia, LE edema or dysuria. Of note, the pt has difficulty providing specifics and details of her symptoms. Of note: patient presented [facility] x 1 weeks for similar complaints, but was discharged home. She was then admitted last Wednesday and found to have hyponatremia. She was dc'ed on Friday and returned to ED (20 Jul 2019 05:04)        On admission:    7/20 Echo: Normal left ventricular systolic function. No segmental wall motion abnormalities.    CXR: Clear lungs    CTH: There is no acute intracranial hemorrhage, mass effect, midline shift or extra axial collections.     CT A/P: The reason for the patient's abdominal pain is not elucidated on this examination.    UCx: ngtd     Treponema pallidum Ab: nonreactive    hsTrop: <6 x 2         Evaluated by GI: Abdominal discomfort- likely due to gastric ulcer and duodenitis, rule out H pylori. Hyponatremia- chronic    S/P EGD 7/23: Normal esophagus. Non-bleeding gastric ulcer with no stigmata of bleeding. Biopsied. Duodenitis. Biopsied.    GI recommended avoid NSAIDS & take PPI qd.  Patient to f/u with GI as outpatient for biopsy/pathology results.  Will need treatment for H.Pylori as outpatient if biopsy positive. No further inpatient GI w/u.  Resume usual diet.  F/U in GI clinic as outpatient.         Evaluated by medicine: 65 y/o F with hx of HTN, HLD, hyponatremia presents with abdominal discomfort which describes as burning/bloating sensation with associated nausea x 1 week.    -Abdominal pain: Ddx includes peptic ulcer disease, H pylori infection, gastritis. CT abdomen: not contributory. H Pylori antigen and Ab serum negative. Protnix po and Zofran PRN for nausea. Bowel regimen, Colace, senna, Miralax. s/p EGD as above.  Outpatient f/u with GI as above for biopsy results.     -Atypical chest pain: Due to: GI etiology such as esophagitis vs less likely cardiac. Screening EKG - no acute finding. hsTrop < 6 x2, CK and CKMB wnl. No evidence of ACS. Consider stress test as outpt.      -Hyponatremia: Mild, asymptomatic; Likely due to SIADH due to prolonged abd discomfort with severe nausea; No evidence of dehydration on BUN: Scr ratio. Protonix ordered.  Zofran IV prn while inpatient. Monitor serum sodium and response to NS IV.  Outpatient f/u with PCP within 1-2 weeks for repeat blood work to monitor Na.     -Hypokalemia: supplemented & now normal.  Outpatient f/u for repeat blood work as above.      -Anemia: Monitor H/H. no signs of active bleeding.     -R/O Hallucination: Pt is psychotic, ? contributing to her current presentation. Pt refused interview with [facility] . R/o underlying Psychiatric issues. Appreciate Psych recs.    -Dizziness: CT head not contributory: There is no acute intracranial hemorrhage, mass effect, midline shift or extra axial collections. Echo normal.  Continue meclizine prn.  PT recommended home PT.  Fall risk.  Orthostatics negative.     -Hypertension: cont bystolic.     -Hyperlipemia: cont fenofibrate.         Evaluated by psych: 65 y/o Female with possible PPH of depression/anxiety, PMH of HTN, HLD, hyponatremia presents with abdominal discomfort which describes as burning/bloating sensation with associated nausea and no vomiting x 1 week. Psych c/s for new onset paranoia, AH/VH, confusion. Patient exhibits delirium with waxing/waning paranoia/hallucinations/sensorium which may be 2/2 delayed clonazepam withdrawal given long half-life of this med, no tremors but N/V, possible headaches (pt clutching head), confusion and hallucinations present. Would restart on klonopin with CIWA for now, can use zyprexa PRN if necessary for agitation. Also may consider delirium 2/2 meclizine use as patient has gotten some PRNs of this. Given history from family unlikely to be dementia as patient's condition appears to have started acutely. On mirtazapine at home but would hold off on restarting this for now. Discussed risks/benefits of above meds with family, amenable to use. Na stabilizing per primary team. CT head wnl. B12/folate WNL.  RPR negative. Delirium much improved 7/24. Pt denies perceptual disturbances, SI/HI. Pt's mental status has returned to baseline per family.  Patient cleared by psych for d/c.  Continue klonopin (home medication).         Patient cleared for d/c home by attending MD & all consultants.  Outpatient f/u with GI, PCP, psych.  Outpatient f/u of bx results in GI office.  Outpatient repeat blood work in 1-2 weeks by PCP to monitor electrolytes. HPI:    63 y/o Female with hx of HTN, HLD, hyponatremia presents with abdominal discomfort which describes as burning/bloating sensation with associated nausea and no vomiting x 1 week. The symptoms are intermittent, but can be intense at times and also associated with midsternal chest discomfort, non radiating x 1 week. Chest pain occurs with food. She states she also gets dizzy and get SOB when the abdominal discomfort is terrible. Denies fever, cough, falls, LOC, PINON, vomiting, diarrhea, constipation, melena, hematochezia, LE edema or dysuria. Of note, the pt has difficulty providing specifics and details of her symptoms. Of note: patient presented [facility] x 1 weeks for similar complaints, but was discharged home. She was then admitted last Wednesday and found to have hyponatremia. She was dc'ed on Friday and returned to ED (20 Jul 2019 05:04)        On admission:    7/20 Echo: Normal left ventricular systolic function. No segmental wall motion abnormalities.    CXR: Clear lungs    CTH: There is no acute intracranial hemorrhage, mass effect, midline shift or extra axial collections.     CT A/P: The reason for the patient's abdominal pain is not elucidated on this examination.    UCx: ngtd     Treponema pallidum Ab: nonreactive    hsTrop: <6 x 2         Evaluated by GI: Abdominal discomfort- likely due to gastric ulcer and duodenitis, rule out H pylori. Hyponatremia- chronic    S/P EGD 7/23: Normal esophagus. Non-bleeding gastric ulcer with no stigmata of bleeding. Biopsied. Duodenitis. Biopsied.    GI recommended avoid NSAIDS & take PPI qd.  Patient to f/u with GI as outpatient for biopsy/pathology results.  Will need treatment for H.Pylori as outpatient if biopsy positive. No further inpatient GI w/u.  Resume usual diet.  F/U in GI clinic as outpatient.         Evaluated by medicine: 63 y/o F with hx of HTN, HLD, hyponatremia presents with abdominal discomfort which describes as burning/bloating sensation with associated nausea x 1 week.    -Abdominal pain: Ddx includes peptic ulcer disease, H pylori infection, gastritis. CT abdomen: not contributory. H Pylori antigen and Ab serum negative. Protnix po and Zofran PRN for nausea. Bowel regimen, Colace, senna, Miralax. s/p EGD as above.  Outpatient f/u with GI as above for biopsy results.     -Atypical chest pain: Due to: GI etiology such as esophagitis vs less likely cardiac. Screening EKG - no acute finding. hsTrop < 6 x2, CK and CKMB wnl. No evidence of ACS. Consider stress test as outpt.      -Hyponatremia: Mild, asymptomatic; Likely due to SIADH due to prolonged abd discomfort with severe nausea; No evidence of dehydration on BUN: Scr ratio. Protonix ordered.  Zofran IV prn while inpatient. Monitor serum sodium and response to NS IV.  Outpatient f/u with PCP within 1-2 weeks for repeat blood work to monitor Na.     -Hypokalemia: supplemented & now normal.  Outpatient f/u for repeat blood work as above.      -Anemia: Monitor H/H. no signs of active bleeding.     -R/O Hallucination: Pt is psychotic, ? contributing to her current presentation. Pt refused interview with [facility] . R/o underlying Psychiatric issues. Appreciate Psych recs.    -Dizziness: CT head not contributory: There is no acute intracranial hemorrhage, mass effect, midline shift or extra axial collections. Echo normal.  Continue meclizine prn.  PT recommended home PT.  Fall risk.  Orthostatics negative.     -Hypertension: cont bystolic.     -Hyperlipemia: cont fenofibrate.         Evaluated by psych: 63 y/o Female with possible PPH of depression/anxiety, PMH of HTN, HLD, hyponatremia presents with abdominal discomfort which describes as burning/bloating sensation with associated nausea and no vomiting x 1 week. Psych c/s for new onset paranoia, AH/VH, confusion. Patient exhibits delirium with waxing/waning paranoia/hallucinations/sensorium which may be 2/2 delayed clonazepam withdrawal given long half-life of this med, no tremors but N/V, possible headaches (pt clutching head), confusion and hallucinations present. Would restart on klonopin with CIWA for now, can use zyprexa PRN if necessary for agitation. Also may consider delirium 2/2 meclizine use as patient has gotten some PRNs of this. Given history from family unlikely to be dementia as patient's condition appears to have started acutely. On mirtazapine at home but would hold off on restarting this for now. Discussed risks/benefits of above meds with family, amenable to use. Na stabilizing per primary team. CT head wnl. B12/folate WNL.  RPR negative. Delirium much improved 7/24. Pt denies perceptual disturbances, SI/HI. Pt's mental status has returned to baseline per family.  Patient cleared by psych for d/c.  Continue klonopin 1mg BID(home medication).         Patient cleared for d/c home by attending MD & all consultants.  Outpatient f/u with GI, PCP, psych.  Outpatient f/u of bx results in GI office.  Outpatient repeat blood work in 1-2 weeks by PCP to monitor electrolytes.

## 2019-07-24 NOTE — PROGRESS NOTE BEHAVIORAL HEALTH - NSBHFUPINTERVALHXFT_PSY_A_CORE
Pt received Ambien PRN insomnia last night. Pt scored 0s on CIWA.   [place]  ID [id number] ([place]). On evaluation today patient is seen with son-in-law and grandson at bedside. Patient is calm, alert, euthymic. She states that she feels much better physically and inquires about supplements for her overall health. She reports that she came to [facility] to get good medical treatment and to see her grandchildren. Patient denies AH/VH/SI/HI. Patient cites her Yazidism as major factor in her hopefulness. Patient reports feeling safe in the hospital. No delusions were elicited.    Per son-in-law at bedside, patient's confusion, hallucinations and paranoia have improved and patient appears to be at baseline. He has no acute safety concerns for patient returning home.      Per grandson, patient does not know how to say the date at baseline. Pt states that pain has resolved and she wants to go home today. Patient feels safe in the hospital. She continues to have AH of voices telling her she should go home but states that they are diminishing. Denies CAH. She denies SI/HI/VH.    Tried calling on listed number Mr. Demetrio Pierre [phone], unable to reach, awaiting call back Pt received Ambien PRN insomnia last night. Pt scored 0s on CIWA.     [place]  ID [id number] ([facility]). On evaluation today patient is seen with son-in-law and grandson at bedside. Patient is calm, alert, euthymic. She states that she feels much better physically and inquires about supplements for her overall health. She reports that she came to [facility] to get good medical treatment and to see her grandchildren. Patient denies AH/VH/SI/HI. Patient cites her Religious as major factor in her hopefulness. Patient reports feeling safe in the hospital. No delusions were elicited.    Per son-in-law at bedside, patient's confusion, hallucinations and paranoia have improved and patient appears to be at baseline. He has no acute safety concerns for patient returning home.    Per patient's daughter Aliyah (/[place]  ID [id number]): Daughter reported no safety concerns with patient returning home today as long as patient is medically cleared. Pt received Ambien PRN insomnia last night. Pt scored 0s on CIWA.     [place]  ID [id number] ([facility]). On evaluation today patient is seen with son-in-law and grandson at bedside. Patient is calm, alert, euthymic. She states that she feels much better physically and inquires about supplements for her overall health. She reports that she came to [facility] to get good medical treatment and to see her grandchildren. Patient denies AH/VH/SI/HI. Patient cites her Congregational as major factor in her hopefulness. Patient reports feeling safe in the hospital. No delusions were elicited.    Per son-in-law at bedside, patient's confusion, hallucinations and paranoia have improved and patient appears to be at baseline. He has no acute safety concerns for patient returning home.    Collateral received over telephone from patient's daughter Aliyah (/[place]  ID [id number]): Daughter reported no safety concerns with patient returning home today as long as patient is medically cleared. Pt received Ambien PRN insomnia last night. Pt scored 0s on CIWA.     [place]  ID [id number] ([facility]). On evaluation today patient is seen with son-in-law and grandson at bedside. Patient is calm, alert, euthymic. She states that she feels much better physically and inquires about supplements/vitamins for her overall health. She reports that she came to [facility] to get good medical treatment and to see her grandchildren. Patient denies AH/VH/SI/HI.  Reports that staff has been treating her well. Patient cites her Scientology and family as major factor in her hopefulness. Patient reports feeling safe in the hospital. No delusions were elicited.    Per son-in-law at bedside, Mr. Acharya,  patient's confusion, hallucinations and paranoia have improved and patient is looking good and appears to be at baseline. He has no acute safety concerns for patient returning home. Discussed that pt. will need follow up and team will provide the referrals    Collateral received over telephone from patient's daughter Aliyah (/[place]  ID [id number]): Daughter reported no acute safety concerns with patient returning home today as long as patient is medically cleared.

## 2019-07-24 NOTE — DISCHARGE NOTE PROVIDER - NSFOLLOWUPCLINICS_GEN_ALL_ED_FT
[facility] General Internal Medicine  General Internal Medicine  [address] [id number]  Phone: [phone]  Fax:   Follow Up Time:

## 2019-07-24 NOTE — DISCHARGE NOTE PROVIDER - PROVIDER TOKENS
PROVIDER:[TOKEN:[[id number]]],FREE:[LAST:[Psychiatry follow up],PHONE:[(   )    -],FAX:[(   )    -],ADDRESS:[[facility] outpt. clinic: [phone]   [facility] outpt. walk in clinic : [phone] (9AM-7PM)]]

## 2019-07-24 NOTE — PROGRESS NOTE ADULT - PROBLEM SELECTOR PLAN 2
Due to: GI etiology such as esophagitis vs less likely cardiac;  Admit to tele  check cbc, bmp, a1c, flp, tsh (-) X2  Echo ordered   Screening EKG - no acute finding  hsTrop < 6 x2, CK and CKMB wnl  No evidence of ACS  Consider stress test as outpt
Due to: GI etiology such as esophagitis vs less likely cardiac;  Screening EKG - no acute finding  hsTrop < 6 x2, CK and CKMB wnl  No evidence of ACS  Consider stress test as outpt  D/C tele
Due to: GI etiology such as esophagitis vs less likely cardiac;  Admit to tele  check cbc, bmp, a1c, flp, tsh (-) X2  Echo ordered   Screening EKG - no acute finding  hsTrop < 6 x2, CK and CKMB wnl  No evidence of ACS  Consider stress test as outpt

## 2019-07-24 NOTE — DISCHARGE NOTE PROVIDER - CARE PROVIDER_API CALL
Fiona Wahl)  Gastroenterology; Internal Medicine  [address] [place] [address], [address]  [place] [address]  Phone: [phone]  Fax: [phone]  Follow Up Time:     Psychiatry follow up,   [facility] Ger[facility] outpt. clinic: [phone]   [facility] outpt. walk in clinic : [phone] (9AM-7PM)  Phone: (   )    -  Fax: (   )    -  Follow Up Time:

## 2019-07-24 NOTE — PROGRESS NOTE BEHAVIORAL HEALTH - CASE SUMMARY
Patient seen and evaluated, agree with above assessment and plan,  ID #[id number], pt. AAOX2, appears calm and cooperative. She stated that she is feeling safe in the hospital but at times feels scared, unable to elaborate why she is feeling scared. Denies AH and VH. Denies SI and HI. Delirium seems to be improving with less paranoia. Tried calling pt.'s daughter at [phone], unable to reach, Recommend meds. as written above, will follow
Patient seen and evaluated, agree with above assessment and plan, pt. AAOX2, overall calm and cooperative, pt. presenting with paranoia/ hallucinations /waxing and waning mental status, she denies SI and HI. Presentation is c/w likely delirium due to possibly benzo. withdrawal vs other medical conditions. Recommend meds. as written above, collateral needed from pt.'s daughter, pt. gave consent to talk to her daughter, will follow
Patient seen and evaluated, agree with above assessment and plan, pt. AAOX2, calm and cooperative, she denies acute psychotic sxs, denies SI and HI. Patient is future oriented and looking forward to go home. Met with son-in-law at bedside, he thinks that patient is looking well and she is at her baseline.  Collateral received from daughter over the phone (please see above for details). Recommend meds. as written above, outpt. referrals as above, case discussed with team.

## 2019-07-24 NOTE — PROGRESS NOTE BEHAVIORAL HEALTH - RISK ASSESSMENT
risk for harm due to confusion and delirium, some paranoia  Protective factors; Denies SI and HI, supportive family, future oriented, Episcopal risk for harm due to confusion and delirium, some paranoia  Protective factors; Denies SI and HI, supportive family, future oriented, Advent    Patient is not at acute risk of harm to self or others and does not need psychiatric admission.

## 2019-07-24 NOTE — PROGRESS NOTE ADULT - PROBLEM SELECTOR PLAN 5
Monitor H/H  no signs of active bleeding

## 2019-07-24 NOTE — PROGRESS NOTE BEHAVIORAL HEALTH - GROOMING
Follow up with pediatrician in 1-2 days.    Return in 10 days for removal.    Laceration    A laceration is a cut that goes through all of the layers of the skin and into the tissue that is right under the skin. Some lacerations heal on their own. Others need to be closed with stitches (sutures), staples, skin adhesive strips, or skin glue. Proper laceration care minimizes the risk of infection and helps the laceration to heal better.     SEEK IMMEDIATE MEDICAL CARE IF YOU HAVE THE FOLLOWING SYMPTOMS: swelling around the wound, worsening pain, drainage from the wound, red streaking going away from your wound, inability to move finger or toe near the laceration, or discoloration of skin near the laceration.
Fair

## 2019-07-24 NOTE — DISCHARGE NOTE NURSING/CASE MANAGEMENT/SOCIAL WORK - NSDCDPATPORTLINK_GEN_ALL_CORE
You can access the [facility] Patient Portal, offered by [facility], by registering with the following website: [email or web address]

## 2019-07-24 NOTE — DISCHARGE NOTE PROVIDER - NSDCCPCAREPLAN_GEN_ALL_CORE_FT
PRINCIPAL DISCHARGE DIAGNOSIS  Diagnosis: Abdominal pain  Assessment and Plan of Treatment:       SECONDARY DISCHARGE DIAGNOSES  Diagnosis: Hyponatremia  Assessment and Plan of Treatment:     Diagnosis: Dizziness  Assessment and Plan of Treatment:     Diagnosis: Chest pain  Assessment and Plan of Treatment: PRINCIPAL DISCHARGE DIAGNOSIS  Diagnosis: Atypical chest pain  Assessment and Plan of Treatment: Continue medications as prescribed including protonix.  Follow up with GI within 1-2 weeks for results of your biopsy from your endoscopy; call for appointment.  Follow up with your PCP within 1-2 weeks; call for appointment.  Your PCP may consider doing a nuclear stress test as outpatient.      SECONDARY DISCHARGE DIAGNOSES  Diagnosis: Duodenitis  Assessment and Plan of Treatment: Your endoscopy revealed duodenitis which was biopsied.  Continue medications as prescribed including protonix.  Follow up with GI within 1-2 weeks for results of your biopsy; call for appointment.    Diagnosis: PUD (peptic ulcer disease)  Assessment and Plan of Treatment: Your endoscopy revealed an ulcer which was biopsied.  Continue medications as prescribed including protonix.  Follow up with GI within 1-2 weeks for results of your biopsy; call for appointment.    Diagnosis: Pre-diabetes  Assessment and Plan of Treatment: Continue diet modification. Avoid complex carbohydrates such as bread, pasta, cereal, white rice, white potatoes, etc. Avoid concentrated sugar as found in desserts, candy, soda, juice, etc. Consume a diet based on lean protein (chicken, fish) and vegetables.   Follow up with your PCP for repeat HgA1C blood test in 3 months; call for appointment.    Diagnosis: Hypertension  Assessment and Plan of Treatment: Low sodium and fat diet, continue anti-hypertensive medications, and follow up with primary care physician.    Diagnosis: Hyperlipemia  Assessment and Plan of Treatment: Low fat diet, exercise daily and continue current medications. Follow up with primary care physician and cardiologist for management.    Diagnosis: Delirium  Assessment and Plan of Treatment: Continue medications as prescribed including klonopin.  Follow up with psychiatry within 1-2 weeks for ongoing management; call for appointment.  [facility] ([facility]) Geriatric outpatient clinic: [phone]   [facility] outpatient walk in clinic : [phone] (9AM-7PM)    Diagnosis: Hyponatremia  Assessment and Plan of Treatment: Follow up with your PCP within 1-2 weeks for repeat blood work to monitor your electrolytes (sodium); call for appointment.

## 2019-07-24 NOTE — PROGRESS NOTE ADULT - SUBJECTIVE AND OBJECTIVE BOX
Patient is a 64y old  Female who presents with a chief complaint of Chest pressure (23 Jul 2019 12:58)      SUBJECTIVE / OVERNIGHT EVENTS:  no complaints of abd pain  MEDICATIONS  (STANDING):  aspirin enteric coated 81 milliGRAM(s) Oral daily  clonazePAM  Tablet 1 milliGRAM(s) Oral two times a day  docusate sodium 100 milliGRAM(s) Oral three times a day  fenofibrate Tablet 145 milliGRAM(s) Oral daily  folic acid 1 milliGRAM(s) Oral daily  heparin  Injectable 5000 Unit(s) SubCutaneous every 12 hours  multivitamin 1 Tablet(s) Oral daily  nebivolol 2.5 milliGRAM(s) Oral daily  pantoprazole  Injectable 40 milliGRAM(s) IV Push daily  polyethylene glycol 3350 17 Gram(s) Oral daily  senna 2 Tablet(s) Oral at bedtime  sodium chloride 0.9% lock flush 3 milliLiter(s) IV Push every 8 hours  sodium chloride 0.9%. 1000 milliLiter(s) (50 mL/Hr) IV Continuous <Continuous>    MEDICATIONS  (PRN):  hyoscyamine SL 0.125 milliGRAM(s) SubLingual every 4 hours PRN abd pain  LORazepam     Tablet 2 milliGRAM(s) Oral every 2 hours PRN Symptom-triggered 2 point increase in CIWA-Ar  meclizine 25 milliGRAM(s) Oral three times a day PRN Dizziness  OLANZapine 1.25 milliGRAM(s) Oral every 6 hours PRN agitation  OLANZapine Disintegrating Tablet 1.25 milliGRAM(s) Oral every 6 hours PRN agitation  OLANZapine Injectable 1.25 milliGRAM(s) IntraMuscular every 6 hours PRN agitation  ondansetron Injectable 4 milliGRAM(s) IV Push every 6 hours PRN Nausea and/or Vomiting  zolpidem 5 milliGRAM(s) Oral at bedtime PRN Insomnia              PHYSICAL EXAM:  GENERAL: NAD, well-developed  HEAD:  Atraumatic, Normocephalic  EYES: EOMI, PERRLA, conjunctiva and sclera anicteric  NECK: Supple, No JVD  CHEST/LUNG: Clear to auscultation bilaterally; No wheeze  HEART: Regular rate and rhythm; No murmurs, rubs, or gallops  ABDOMEN: Soft, Nontender, Nondistended; Bowel sounds present, no hepatosplenomegaly, no rebound or guarding  EXTREMITIES:  2+ Peripheral Pulses, No clubbing, cyanosis, or edema    NEUROLOGY: non-focal, no asterixis  SKIN: No rashes or lesion    LABS:                        10.6   7.76  )-----------( 311      ( 24 Jul 2019 06:30 )             32.9     07-24    131<L>  |  96<L>  |  13  ----------------------------<  95  3.6   |  21<L>  |  0.71    Ca    9.3      24 Jul 2019 06:30        PT/INR - ( 23 Jul 2019 05:40 )   PT: 12.2 SEC;   INR: 1.07          PTT - ( 23 Jul 2019 05:40 )  PTT:32.3 SEC          RADIOLOGY & ADDITIONAL TESTS:

## 2019-07-24 NOTE — PROGRESS NOTE BEHAVIORAL HEALTH - NSBHCHARTREVIEWVS_PSY_A_CORE FT
Vital Signs Last 24 Hrs  T(C): 36.4 (23 Jul 2019 07:58), Max: 36.6 (22 Jul 2019 12:03)  T(F): 97.6 (23 Jul 2019 07:58), Max: 97.9 (23 Jul 2019 07:39)  HR: 81 (23 Jul 2019 07:58) (81 - 102)  BP: 117/76 (23 Jul 2019 07:58) (104/72 - 129/84)  BP(mean): --  RR: 17 (23 Jul 2019 07:58) (16 - 18)  SpO2: 96% (23 Jul 2019 07:58) (96% - 100%)
Vital Signs Last 24 Hrs  T(C): 37.2 (24 Jul 2019 05:29), Max: 37.2 (24 Jul 2019 05:29)  T(F): 98.9 (24 Jul 2019 05:29), Max: 98.9 (24 Jul 2019 05:29)  HR: 93 (24 Jul 2019 05:29) (76 - 96)  BP: 105/68 (24 Jul 2019 05:29) (104/62 - 137/84)  BP(mean): --  RR: 17 (24 Jul 2019 05:29) (17 - 18)  SpO2: 100% (24 Jul 2019 05:29) (97% - 100%)
Vital Signs Last 24 Hrs  T(C): 36.4 (22 Jul 2019 16:05), Max: 36.8 (21 Jul 2019 18:50)  T(F): 97.6 (22 Jul 2019 16:05), Max: 98.3 (21 Jul 2019 18:50)  HR: 102 (22 Jul 2019 16:05) (63 - 110)  BP: 127/87 (22 Jul 2019 16:05) (125/63 - 145/89)  BP(mean): --  RR: 18 (22 Jul 2019 16:05) (16 - 18)  SpO2: 100% (22 Jul 2019 16:05) (98% - 100%)

## 2019-07-24 NOTE — PROGRESS NOTE BEHAVIORAL HEALTH - NSBHCONSULTMEDS_PSY_A_CORE FT
c/w klonopin 1mg BID (home med)
c/w klonopin 1mg BID  [facility]
c/w klonopin 1mg BID  [facility]

## 2019-07-24 NOTE — PROGRESS NOTE BEHAVIORAL HEALTH - NSBHCHARTREVIEWLAB_PSY_A_CORE FT
10.5   8.29  )-----------( 308      ( 23 Jul 2019 05:40 )             33.5     07-23    133<L>  |  99  |  10  ----------------------------<  97  4.1   |  21<L>  |  0.69    Ca    9.3      23 Jul 2019 05:40  Phos  4.2     07-22  Mg     2.0     07-22    TPro  8.5<H>  /  Alb  4.6  /  TBili  0.6  /  DBili  x   /  AST  28  /  ALT  25  /  AlkPhos  74  07-21
10.6   7.76  )-----------( 311      ( 24 Jul 2019 06:30 )             32.9     07-24    131<L>  |  96<L>  |  13  ----------------------------<  95  3.6   |  21<L>  |  0.71    Ca    9.3      24 Jul 2019 06:30    Vitamin B12, Serum in AM (07.22.19 @ 08:22)    Vitamin B12, Serum: 2000 pg/mL    Folate, Serum in AM (07.22.19 @ 08:22)    Folate, Serum: > 20.0:   PLEASE NOTE NEW REFERENCE RANGE ng/mL    Thyroid Stimulating Hormone, Serum (07.20.19 @ 11:30)    Thyroid Stimulating Hormone, Serum: 2.00 uIU/mL
11.0   9.67  )-----------( 336      ( 22 Jul 2019 08:22 )             33.9     07-22    132<L>  |  97<L>  |  9   ----------------------------<  112<H>  3.9   |  21<L>  |  0.72    Ca    9.3      22 Jul 2019 08:22  Phos  4.2     07-22  Mg     2.0     07-22    TPro  8.5<H>  /  Alb  4.6  /  TBili  0.6  /  DBili  x   /  AST  28  /  ALT  25  /  AlkPhos  74  07-21    Thyroid Stimulating Hormone, Serum (07.20.19 @ 11:30)    Thyroid Stimulating Hormone, Serum: 2.00 uIU/mL    Vitamin B12, Serum in AM (07.22.19 @ 08:22)    Vitamin B12, Serum: 2000 pg/mL    Syphilis Screen (07.22.19 @ 08:22)    Treponema Pallidum Antibody Interpretation: Negative

## 2019-07-24 NOTE — PROGRESS NOTE ADULT - PROBLEM SELECTOR PLAN 7
CT head not contributory: There is no acute intracranial hemorrhage, mass effect, midline shift or extra axial collections.   Check orthostatics x3 checks  Fall risk  echo ordered  cont meclizine  Telemetry  PT eval to assess gait stability, fall precautions for now
cont bystolic

## 2019-07-24 NOTE — PROGRESS NOTE BEHAVIORAL HEALTH - SUMMARY
63 y/o Female with possible PPH of depression/anxiety, PMH of HTN, HLD, hyponatremia presents with abdominal discomfort which describes as burning/bloating sensation with associated nausea and no vomiting x 1 week. Psych c/s for new onset paranoia, AH/VH, confusion.     Patient exhibits delirium with waxing/waning paranoia/hallucinations/sensorium which may be 2/2 delayed clonazepam withdrawal given long half-life of this med, no tremors but N/V, possible headaches (pt clutching head), confusion and hallucinations present. Would restart on klonopin with CIWA for now, can use zyprexa PRN if necessary for agitation. Also may consider delirium 2/2 meclizine use as patient has gotten some PRNs of this. Given history from family unlikely to be dementia as patient's condition appears to have started acutely. On mirtazapine at home but would hold off on restarting this for now. Discussed risks/benefits of above meds with family, amenable to use.     Na stabilizing per primary team. CT head wnl. Would f/u b12, folate, RPR.    7/22: Patient c/t have paranoia and perceptual disturbances, likely 2/2 delirium due to possibly benzo. withdrawal vs GMC    7/23: Patient with improving delirium overall with less paranoia. C/t have some perceptual disturbances.    7/24: Delirium much improved. Pt denies perceptual disturbances, SI/HI. Pt's mental status has returned to baseline per family. 65 y/o Female with possible PPH of depression/anxiety, PMH of HTN, HLD, hyponatremia presents with abdominal discomfort which describes as burning/bloating sensation with associated nausea and no vomiting x 1 week. Psych c/s for new onset paranoia, AH/VH, confusion.     Patient exhibits delirium with waxing/waning paranoia/hallucinations/sensorium which may be 2/2 delayed clonazepam withdrawal given long half-life of this med, no tremors but N/V, possible headaches (pt clutching head), confusion and hallucinations present. Would restart on klonopin with CIWA for now, can use zyprexa PRN if necessary for agitation. Also may consider delirium 2/2 meclizine use as patient has gotten some PRNs of this. Given history from family unlikely to be dementia as patient's condition appears to have started acutely. On mirtazapine at home but would hold off on restarting this for now. Discussed risks/benefits of above meds with family, amenable to use.     Na stabilizing per primary team. CT head wnl. Would f/u b12, folate, RPR.    7/22: Patient c/t have paranoia and perceptual disturbances, likely 2/2 delirium due to possibly benzo. withdrawal vs GMC    7/23: Patient with improving delirium overall with less paranoia. C/t have some perceptual disturbances.    7/24: Delirium much improved. Pt denies perceptual disturbances, SI/HI. No delusions or paranoia elicited. Pt's mental status has returned to baseline per family. Family has no acute safety concerns.

## 2019-07-24 NOTE — PROGRESS NOTE ADULT - ASSESSMENT
Impression:  1) Abdominal discomfort- likely due to gastric ulcer and duodenitis, rule out H pylori  2) Hyponatremia- chronic    Recommendations  -PPI daily  -f/u path for H pylori  -no further GI workup planned as inpatient Impression:  1) Abdominal discomfort- likely due to gastric ulcer and duodenitis, rule out H pylori  2) Hyponatremia- chronic    Recommendations  -PPI daily  -f/u path for H pylori  -no further GI workup planned as inpatient  - call with questions

## 2019-07-24 NOTE — PROGRESS NOTE ADULT - REASON FOR ADMISSION
Chest pressure

## 2019-07-24 NOTE — PROGRESS NOTE ADULT - PROBLEM SELECTOR PLAN 1
Ddx includes peptic ulcer disease, H pylori infection, gastritis   CT abdomen: not contributory   GI consult  for possible EGD  H Pylori antigen and Ab studies  Empiric Protnix IV and Zofran PRN for nausea  Bowel regimen, Colace, senna, Miralax
Ddx includes peptic ulcer disease, H pylori infection, gastritis   CT abdomen: not contributory   GI consult  for possible EGD  H Pylori antigen and Ab studies  Empiric Protnix IV and Zofran PRN for nausea  Bowel regimen, Colace, senna, Miralax
Ddx includes peptic ulcer disease, H pylori infection, gastritis   CT abdomen: not contributory   H Pylori antigen and Ab studies  Empiric Protnix IV and Zofran PRN for nausea  Bowel regimen, Colace, senna, Miralax  Anticipate possible EGD tomorrow
Ddx includes peptic ulcer disease, H pylori infection, gastritis   CT abdomen: not contributory   H Pylori antigen and Ab studies  Protnix po and Zofran PRN for nausea  Bowel regimen, Colace, senna, Miralax
Ddx includes peptic ulcer disease, H pylori infection, gastritis   CT abdomen: not contributory   H Pylori antigen and Ab studies  Empiric Protnix IV and Zofran PRN for nausea  Bowel regimen, Colace, senna, Miralax  EGD today

## 2019-07-24 NOTE — PROGRESS NOTE BEHAVIORAL HEALTH - PRIMARY DX
Benzodiazepine withdrawal with delirium

## 2019-07-24 NOTE — DISCHARGE NOTE PROVIDER - NSDCFUADDINST_GEN_ALL_CORE_FT
Follow up with Gastroenterology within 1-2 weeks for results of your biopsy; call for appointment.    Follow up with your PCP within 1-2 weeks for repeat blood work to monitor your sodium level; call for appointment.      Follow up with psych within 1-2 weeks; call for appointment.  [facility] ([facility]) Geriatric outpatient clinic: [phone]   [facility] outpatient walk in clinic : [phone] (9AM-7PM) Follow up with Gastroenterology within 1-2 weeks for results of your biopsy; call for appointment.    Follow up with your PCP within 1-2 weeks for repeat blood work to monitor your sodium level; call for appointment.      Follow up with psychiatry within 1-2 weeks; call for appointment.  [facility] ([facility]) Geriatric outpatient clinic: [phone]   [facility] outpatient walk in clinic : [phone] (9AM-7PM)

## 2019-07-24 NOTE — PROGRESS NOTE ADULT - ATTENDING COMMENTS
I have seen and examined this patient with the GI fellow. Agree with above.    Fiona Wahl MD  GI Attending, Faculty Practice  Office: [phone]
I have seen and examined this patient with the GI fellow. Agree with above.    Fiona Wahl MD  GI Attending, Faculty Practice  Office: [phone]    Addendum:  patient with chronic abdominal pains, plan for EGD as outpatient vs. inpatient, pt had recent endoscopy last year with same symptoms, no findings at that time.    Will place patient as a scheduled case tomorrow
Time planning discharge 35 minutes.

## 2019-07-24 NOTE — PROGRESS NOTE BEHAVIORAL HEALTH - NSBHCHARTREVIEWINVESTIGATE_PSY_A_CORE FT
< from: 12 Lead ECG (07.19.19 @ 18:24) >    QTC Calculation(Bezet) 439 ms    < end of copied text >

## 2019-07-24 NOTE — PROGRESS NOTE ADULT - PROVIDER SPECIALTY LIST ADULT
Gastroenterology
Gastroenterology
Hospitalist
Gastroenterology
Hospitalist

## 2019-07-24 NOTE — PROGRESS NOTE ADULT - PROBLEM SELECTOR PLAN 3
Mild, asymptomatic; Likely due to SIADH due to prolonged abd discomfort with severe nausea; No evidence of dehydration on BUN: Scr ratio;   Protonix IV   Zofran IV  Monitor serum sodium and response to NS IV  check serum and urine osmo, serum uric acid in AM
Mild, asymptomatic; Likely due to SIADH due to prolonged abd discomfort with severe nausea; No evidence of dehydration on BUN: Scr ratio;   Protonix IV   Zofran IV  Monitor serum sodium and response to NS IV  check serum and urine osmo, serum uric acid in AM, still pending

## 2019-07-25 LAB — SURGICAL PATHOLOGY STUDY: SIGNIFICANT CHANGE UP

## 2019-07-31 ENCOUNTER — INBOUND DOCUMENT (OUTPATIENT)
Age: 64
End: 2019-07-31

## 2019-07-31 PROBLEM — Z00.00 ENCOUNTER FOR PREVENTIVE HEALTH EXAMINATION: Status: ACTIVE | Noted: 2019-07-31

## 2019-08-18 ENCOUNTER — INPATIENT (INPATIENT)
Facility: HOSPITAL | Age: 64
LOS: 2 days | Discharge: PSYCHIATRIC OR SUBSTANCE USE FACILITY | End: 2019-08-21
Attending: HOSPITALIST | Admitting: HOSPITALIST
Payer: MEDICAID

## 2019-08-18 VITALS
OXYGEN SATURATION: 99 % | SYSTOLIC BLOOD PRESSURE: 142 MMHG | DIASTOLIC BLOOD PRESSURE: 106 MMHG | TEMPERATURE: 99 F | HEART RATE: 89 BPM | RESPIRATION RATE: 18 BRPM

## 2019-08-18 LAB
ALBUMIN SERPL ELPH-MCNC: 4.4 G/DL — SIGNIFICANT CHANGE UP (ref 3.3–5)
ALP SERPL-CCNC: 64 U/L — SIGNIFICANT CHANGE UP (ref 40–120)
ALT FLD-CCNC: 23 U/L — SIGNIFICANT CHANGE UP (ref 4–33)
ANION GAP SERPL CALC-SCNC: 16 MMO/L — HIGH (ref 7–14)
AST SERPL-CCNC: 23 U/L — SIGNIFICANT CHANGE UP (ref 4–32)
BASOPHILS # BLD AUTO: 0.04 K/UL — SIGNIFICANT CHANGE UP (ref 0–0.2)
BASOPHILS NFR BLD AUTO: 0.4 % — SIGNIFICANT CHANGE UP (ref 0–2)
BILIRUB SERPL-MCNC: 0.4 MG/DL — SIGNIFICANT CHANGE UP (ref 0.2–1.2)
BUN SERPL-MCNC: 6 MG/DL — LOW (ref 7–23)
CALCIUM SERPL-MCNC: 9.5 MG/DL — SIGNIFICANT CHANGE UP (ref 8.4–10.5)
CHLORIDE SERPL-SCNC: 95 MMOL/L — LOW (ref 98–107)
CO2 SERPL-SCNC: 21 MMOL/L — LOW (ref 22–31)
CREAT SERPL-MCNC: 0.66 MG/DL — SIGNIFICANT CHANGE UP (ref 0.5–1.3)
EOSINOPHIL # BLD AUTO: 0.06 K/UL — SIGNIFICANT CHANGE UP (ref 0–0.5)
EOSINOPHIL NFR BLD AUTO: 0.6 % — SIGNIFICANT CHANGE UP (ref 0–6)
GLUCOSE SERPL-MCNC: 96 MG/DL — SIGNIFICANT CHANGE UP (ref 70–99)
HCT VFR BLD CALC: 33.9 % — LOW (ref 34.5–45)
HGB BLD-MCNC: 10.9 G/DL — LOW (ref 11.5–15.5)
IMM GRANULOCYTES NFR BLD AUTO: 0.4 % — SIGNIFICANT CHANGE UP (ref 0–1.5)
LYMPHOCYTES # BLD AUTO: 2.61 K/UL — SIGNIFICANT CHANGE UP (ref 1–3.3)
LYMPHOCYTES # BLD AUTO: 26.5 % — SIGNIFICANT CHANGE UP (ref 13–44)
MAGNESIUM SERPL-MCNC: 1.9 MG/DL — SIGNIFICANT CHANGE UP (ref 1.6–2.6)
MCHC RBC-ENTMCNC: 26.5 PG — LOW (ref 27–34)
MCHC RBC-ENTMCNC: 32.2 % — SIGNIFICANT CHANGE UP (ref 32–36)
MCV RBC AUTO: 82.3 FL — SIGNIFICANT CHANGE UP (ref 80–100)
MONOCYTES # BLD AUTO: 0.83 K/UL — SIGNIFICANT CHANGE UP (ref 0–0.9)
MONOCYTES NFR BLD AUTO: 8.4 % — SIGNIFICANT CHANGE UP (ref 2–14)
NEUTROPHILS # BLD AUTO: 6.27 K/UL — SIGNIFICANT CHANGE UP (ref 1.8–7.4)
NEUTROPHILS NFR BLD AUTO: 63.7 % — SIGNIFICANT CHANGE UP (ref 43–77)
NRBC # FLD: 0.02 K/UL — SIGNIFICANT CHANGE UP (ref 0–0)
PHOSPHATE SERPL-MCNC: 4 MG/DL — SIGNIFICANT CHANGE UP (ref 2.5–4.5)
PLATELET # BLD AUTO: 350 K/UL — SIGNIFICANT CHANGE UP (ref 150–400)
PMV BLD: 9 FL — SIGNIFICANT CHANGE UP (ref 7–13)
POTASSIUM SERPL-MCNC: 3.8 MMOL/L — SIGNIFICANT CHANGE UP (ref 3.5–5.3)
POTASSIUM SERPL-SCNC: 3.8 MMOL/L — SIGNIFICANT CHANGE UP (ref 3.5–5.3)
PROT SERPL-MCNC: 8 G/DL — SIGNIFICANT CHANGE UP (ref 6–8.3)
RBC # BLD: 4.12 M/UL — SIGNIFICANT CHANGE UP (ref 3.8–5.2)
RBC # FLD: 13.2 % — SIGNIFICANT CHANGE UP (ref 10.3–14.5)
SODIUM SERPL-SCNC: 132 MMOL/L — LOW (ref 135–145)
TROPONIN T, HIGH SENSITIVITY: < 6 NG/L — SIGNIFICANT CHANGE UP (ref ?–14)
TSH SERPL-MCNC: 1.2 UIU/ML — SIGNIFICANT CHANGE UP (ref 0.27–4.2)
WBC # BLD: 9.85 K/UL — SIGNIFICANT CHANGE UP (ref 3.8–10.5)
WBC # FLD AUTO: 9.85 K/UL — SIGNIFICANT CHANGE UP (ref 3.8–10.5)

## 2019-08-18 PROCEDURE — 71046 X-RAY EXAM CHEST 2 VIEWS: CPT | Mod: 26

## 2019-08-18 PROCEDURE — 70450 CT HEAD/BRAIN W/O DYE: CPT | Mod: 26

## 2019-08-18 RX ORDER — ACETAMINOPHEN 500 MG
650 TABLET ORAL ONCE
Refills: 0 | Status: COMPLETED | OUTPATIENT
Start: 2019-08-18 | End: 2019-08-18

## 2019-08-18 RX ORDER — SODIUM CHLORIDE 9 MG/ML
1000 INJECTION INTRAMUSCULAR; INTRAVENOUS; SUBCUTANEOUS ONCE
Refills: 0 | Status: COMPLETED | OUTPATIENT
Start: 2019-08-18 | End: 2019-08-18

## 2019-08-18 RX ORDER — METOCLOPRAMIDE HCL 10 MG
10 TABLET ORAL ONCE
Refills: 0 | Status: COMPLETED | OUTPATIENT
Start: 2019-08-18 | End: 2019-08-18

## 2019-08-18 RX ADMIN — Medication 650 MILLIGRAM(S): at 22:36

## 2019-08-18 RX ADMIN — SODIUM CHLORIDE 1000 MILLILITER(S): 9 INJECTION INTRAMUSCULAR; INTRAVENOUS; SUBCUTANEOUS at 22:37

## 2019-08-18 RX ADMIN — Medication 10 MILLIGRAM(S): at 22:37

## 2019-08-18 NOTE — ED PROVIDER NOTE - PROGRESS NOTE DETAILS
Elizabeth Goldberger PGY-3: pt states headache improved from previous. Labs and CTH unremarkable. Given what sounds like recent psychotic sx w auditory hallucinations will cs psych Karlos: s/o to Dr Villeda to f/u [facility] read and if neg can go over to  for psych eval. Elizabeth Goldberger PGY-3: pt states headache improved from previous. Labs and CTH unremarkable. Given what sounds like recent psychotic sx w auditory hallucinations will cs psych. Medically cleared Elizabeth Goldberger PGY-3: psych has not yet seen pt Elizabeth Goldberger PGY-3: psych has not yet seen pt, but requests UA and Ucx to be sent prior to stating pt medically cleared Elizabeth Goldberger PGY-3: seen by psych, who states pt would need psychiatric admission and further care acutely, but that since pt has been recently requiring assistance for ambulation from family would not be a candidate for [facility] inpatient. Recommends adm to medicine and to see inpt psych

## 2019-08-18 NOTE — ED PROVIDER NOTE - CLINICAL SUMMARY MEDICAL DECISION MAKING FREE TEXT BOX
64f w hx HTN, HLD, depression here today for multiple complaints, including h/a x3 days, nausea, chest burning, and auditory hallucinations. Pt appears NAD, quiet and obeying some

## 2019-08-18 NOTE — ED PROVIDER NOTE - ATTENDING CONTRIBUTION TO CARE
I was physically present for the E/M service provided. I agree with above history, physical, and plan which I have reviewed and edited where appropriate. I was physically present for the key portions of the service provided.    Karlos: 64 yr old female with hx of depression, anxiety, HTn and HLD presents to ed with family stating depressed affect, crying and feeling burning chest pain with non-specific headache. family states pt need a psych eval as pt states she is hearing voices and is extremely labile.  no fever, no vomiting, no trauma.    *GEN:   comfortable, in no apparent distress, AOx3, appears depressed and very anxious  *EYES:   PERRL, extra-occular movements intact  *HEENT:   airway patent, moist mucosal membranes, uvula midline, poor dentition  *CV:   regular rate and rhythm, normal S1/S2, no murmur  *RESP:   clear to auscultation bilaterally, non-labored, speaking in full sentences  *ABD:   soft, non tender, no guarding  *MSK:   no musculoskeletal tenderness, 5/5 strength, moving all extremity  *SKIN:   dry, intact, no rash  *NEURO:   AOx3, no focal weakness or loss of sensation, gait normal, GCS 15    a/p: likely psychosis with depressed affect r/o ic mass vs lytes imbalance vs thyroid d/o.  labs, ct head, if neg then psych eval once medically cleared

## 2019-08-18 NOTE — ED PROVIDER NOTE - OBJECTIVE STATEMENT
64f w hx HTN, HLD, depression here today for multiple complaints. Pt Arabic-speaking, was offered  but prefers to use grandson at bedside to translate. Also only responds to some questions. They state pt has had headache for past 3 days located superiorly that has been constant. Also associated w dizziness ~room-spinning though pt has had that for months to years, and is already prescribed meclizine. Has had nausea but no vomiting. No fever. She also complains of burning sensation from neck to chest but denies pain or SOB. No cough. Grandson also states that pt has been complaining of hearing voices that aren't there, and pt concurs, stating she is currently hearing voices that are belittling her. Denies any thought of SI/HI or demand hallucinations. Home meds include mirtazapine and risperidone. Grandson states pt has acted unusually in the past but more severe currently.

## 2019-08-18 NOTE — ED ADULT NURSE NOTE - NSIMPLEMENTINTERV_GEN_ALL_ED
Implemented All Fall Risk Interventions:  [place] to call system. Call bell, personal items and telephone within reach. Instruct patient to call for assistance. Room bathroom lighting operational. Non-slip footwear when patient is off stretcher. Physically safe environment: no spills, clutter or unnecessary equipment. Stretcher in lowest position, wheels locked, appropriate side rails in place. Provide visual cue, wrist band, yellow gown, etc. Monitor gait and stability. Monitor for mental status changes and reorient to person, place, and time. Review medications for side effects contributing to fall risk. Reinforce activity limits and safety measures with patient and family.

## 2019-08-18 NOTE — ED ADULT NURSE NOTE - OBJECTIVE STATEMENT
received pt to room 13 aox1-2. Pt Persian speaking grandson bedside translation. Grandson denies  official hx dementia or alzheimers but states pt has been confused for long time this is her normal and that she has been acting paranoid AH but also not new, believes when people speaking around her not in Persian are trying to harm her, states she gets anxious, denies psych hx or medication. Denies SI/HI. Pt calm and cooperative at this time. Pt recent admit for abdominal pain, states that pain is mostly gone complaining today of headache and nausea with no vomiting. Denies chest pain, SOB, weakness, numbness, tingling, urinary symptoms, fevers, chills. Breaths equal and unlabored VSS awaiting MD orders

## 2019-08-18 NOTE — ED PROVIDER NOTE - NSFOLLOWUPINSTRUCTIONS_ED_ALL_ED_FT
You were seen in the emergency department for headache. Please follow up with a neurologist in the next 5-7 days. Return to the emergency department immediately if you experience visual changes, vomiting or any other concerning symptoms.

## 2019-08-18 NOTE — ED ADULT TRIAGE NOTE - CCCP TRG CHIEF CMPLNT
Pt OOB to chair per PT. RN and PCT attempt to get pt from chair back to bed. Pt refusing and getting very anxious and agitated. pts son at bedside. Will attempt to get back back in bed once she is more calm. Son agrees. Pt stable and in no acute distress.    headache

## 2019-08-19 ENCOUNTER — TRANSCRIPTION ENCOUNTER (OUTPATIENT)
Age: 64
End: 2019-08-19

## 2019-08-19 DIAGNOSIS — Z29.9 ENCOUNTER FOR PROPHYLACTIC MEASURES, UNSPECIFIED: ICD-10-CM

## 2019-08-19 DIAGNOSIS — F29 UNSPECIFIED PSYCHOSIS NOT DUE TO A SUBSTANCE OR KNOWN PHYSIOLOGICAL CONDITION: ICD-10-CM

## 2019-08-19 DIAGNOSIS — F32.9 MAJOR DEPRESSIVE DISORDER, SINGLE EPISODE, UNSPECIFIED: ICD-10-CM

## 2019-08-19 DIAGNOSIS — R41.82 ALTERED MENTAL STATUS, UNSPECIFIED: ICD-10-CM

## 2019-08-19 DIAGNOSIS — I10 ESSENTIAL (PRIMARY) HYPERTENSION: ICD-10-CM

## 2019-08-19 DIAGNOSIS — E78.5 HYPERLIPIDEMIA, UNSPECIFIED: ICD-10-CM

## 2019-08-19 DIAGNOSIS — K25.9 GASTRIC ULCER, UNSPECIFIED AS ACUTE OR CHRONIC, WITHOUT HEMORRHAGE OR PERFORATION: ICD-10-CM

## 2019-08-19 LAB
AMPHET UR-MCNC: NEGATIVE — SIGNIFICANT CHANGE UP
APAP SERPL-MCNC: 19.7 UG/ML — SIGNIFICANT CHANGE UP (ref 15–25)
APPEARANCE UR: CLEAR — SIGNIFICANT CHANGE UP
BARBITURATES UR SCN-MCNC: NEGATIVE — SIGNIFICANT CHANGE UP
BENZODIAZ UR-MCNC: NEGATIVE — SIGNIFICANT CHANGE UP
BILIRUB UR-MCNC: NEGATIVE — SIGNIFICANT CHANGE UP
BLOOD UR QL VISUAL: NEGATIVE — SIGNIFICANT CHANGE UP
CANNABINOIDS UR-MCNC: NEGATIVE — SIGNIFICANT CHANGE UP
COCAINE METAB.OTHER UR-MCNC: NEGATIVE — SIGNIFICANT CHANGE UP
COLOR SPEC: COLORLESS — SIGNIFICANT CHANGE UP
ETHANOL BLD-MCNC: < 10 MG/DL — SIGNIFICANT CHANGE UP
GLUCOSE UR-MCNC: NEGATIVE — SIGNIFICANT CHANGE UP
KETONES UR-MCNC: NEGATIVE — SIGNIFICANT CHANGE UP
LEUKOCYTE ESTERASE UR-ACNC: NEGATIVE — SIGNIFICANT CHANGE UP
METHADONE UR-MCNC: NEGATIVE — SIGNIFICANT CHANGE UP
NITRITE UR-MCNC: NEGATIVE — SIGNIFICANT CHANGE UP
OPIATES UR-MCNC: NEGATIVE — SIGNIFICANT CHANGE UP
OXYCODONE UR-MCNC: NEGATIVE — SIGNIFICANT CHANGE UP
PCP UR-MCNC: NEGATIVE — SIGNIFICANT CHANGE UP
PH UR: 7.5 — SIGNIFICANT CHANGE UP (ref 5–8)
PROT UR-MCNC: NEGATIVE — SIGNIFICANT CHANGE UP
SALICYLATES SERPL-MCNC: < 5 MG/DL — LOW (ref 15–30)
SP GR SPEC: 1.01 — SIGNIFICANT CHANGE UP (ref 1–1.04)
UROBILINOGEN FLD QL: NORMAL — SIGNIFICANT CHANGE UP

## 2019-08-19 PROCEDURE — 99232 SBSQ HOSP IP/OBS MODERATE 35: CPT

## 2019-08-19 PROCEDURE — 90792 PSYCH DIAG EVAL W/MED SRVCS: CPT

## 2019-08-19 PROCEDURE — 93010 ELECTROCARDIOGRAM REPORT: CPT

## 2019-08-19 PROCEDURE — 99223 1ST HOSP IP/OBS HIGH 75: CPT | Mod: GC

## 2019-08-19 RX ORDER — OLANZAPINE 15 MG/1
1.25 TABLET, FILM COATED ORAL EVERY 6 HOURS
Refills: 0 | Status: DISCONTINUED | OUTPATIENT
Start: 2019-08-19 | End: 2019-08-19

## 2019-08-19 RX ORDER — ENOXAPARIN SODIUM 100 MG/ML
40 INJECTION SUBCUTANEOUS DAILY
Refills: 0 | Status: DISCONTINUED | OUTPATIENT
Start: 2019-08-19 | End: 2019-08-21

## 2019-08-19 RX ORDER — FENOFIBRATE,MICRONIZED 130 MG
145 CAPSULE ORAL DAILY
Refills: 0 | Status: DISCONTINUED | OUTPATIENT
Start: 2019-08-19 | End: 2019-08-21

## 2019-08-19 RX ORDER — PANTOPRAZOLE SODIUM 20 MG/1
40 TABLET, DELAYED RELEASE ORAL
Refills: 0 | Status: DISCONTINUED | OUTPATIENT
Start: 2019-08-19 | End: 2019-08-21

## 2019-08-19 RX ORDER — OLANZAPINE 15 MG/1
2.5 TABLET, FILM COATED ORAL EVERY 6 HOURS
Refills: 0 | Status: DISCONTINUED | OUTPATIENT
Start: 2019-08-19 | End: 2019-08-21

## 2019-08-19 RX ORDER — METOPROLOL TARTRATE 50 MG
12.5 TABLET ORAL
Refills: 0 | Status: DISCONTINUED | OUTPATIENT
Start: 2019-08-19 | End: 2019-08-19

## 2019-08-19 RX ORDER — METOPROLOL TARTRATE 50 MG
12.5 TABLET ORAL
Refills: 0 | Status: DISCONTINUED | OUTPATIENT
Start: 2019-08-19 | End: 2019-08-20

## 2019-08-19 RX ORDER — CLONAZEPAM 1 MG
1 TABLET ORAL
Refills: 0 | Status: DISCONTINUED | OUTPATIENT
Start: 2019-08-19 | End: 2019-08-21

## 2019-08-19 RX ORDER — POLYETHYLENE GLYCOL 3350 17 G/17G
17 POWDER, FOR SOLUTION ORAL DAILY
Refills: 0 | Status: DISCONTINUED | OUTPATIENT
Start: 2019-08-19 | End: 2019-08-21

## 2019-08-19 RX ORDER — RISPERIDONE 4 MG/1
0.5 TABLET ORAL
Refills: 0 | Status: DISCONTINUED | OUTPATIENT
Start: 2019-08-19 | End: 2019-08-20

## 2019-08-19 RX ORDER — ACETAMINOPHEN 500 MG
650 TABLET ORAL EVERY 6 HOURS
Refills: 0 | Status: DISCONTINUED | OUTPATIENT
Start: 2019-08-19 | End: 2019-08-21

## 2019-08-19 RX ORDER — CLONAZEPAM 1 MG
1 TABLET ORAL DAILY
Refills: 0 | Status: DISCONTINUED | OUTPATIENT
Start: 2019-08-19 | End: 2019-08-19

## 2019-08-19 RX ORDER — ASPIRIN/CALCIUM CARB/MAGNESIUM 324 MG
81 TABLET ORAL DAILY
Refills: 0 | Status: DISCONTINUED | OUTPATIENT
Start: 2019-08-19 | End: 2019-08-21

## 2019-08-19 RX ADMIN — Medication 1 MILLIGRAM(S): at 16:59

## 2019-08-19 RX ADMIN — Medication 81 MILLIGRAM(S): at 11:23

## 2019-08-19 RX ADMIN — Medication 650 MILLIGRAM(S): at 12:30

## 2019-08-19 RX ADMIN — PANTOPRAZOLE SODIUM 40 MILLIGRAM(S): 20 TABLET, DELAYED RELEASE ORAL at 11:23

## 2019-08-19 RX ADMIN — RISPERIDONE 0.5 MILLIGRAM(S): 4 TABLET ORAL at 10:15

## 2019-08-19 RX ADMIN — Medication 1 MILLIGRAM(S): at 10:04

## 2019-08-19 RX ADMIN — Medication 12.5 MILLIGRAM(S): at 16:59

## 2019-08-19 RX ADMIN — OLANZAPINE 1.25 MILLIGRAM(S): 15 TABLET, FILM COATED ORAL at 16:58

## 2019-08-19 RX ADMIN — OLANZAPINE 1.25 MILLIGRAM(S): 15 TABLET, FILM COATED ORAL at 09:50

## 2019-08-19 RX ADMIN — Medication 145 MILLIGRAM(S): at 11:23

## 2019-08-19 RX ADMIN — POLYETHYLENE GLYCOL 3350 17 GRAM(S): 17 POWDER, FOR SOLUTION ORAL at 16:57

## 2019-08-19 RX ADMIN — ENOXAPARIN SODIUM 40 MILLIGRAM(S): 100 INJECTION SUBCUTANEOUS at 16:58

## 2019-08-19 RX ADMIN — Medication 650 MILLIGRAM(S): at 12:00

## 2019-08-19 NOTE — DISCHARGE NOTE PROVIDER - HOSPITAL COURSE
Patient was admitted to medicine for medical work up causing her underlying psychosis. Patient has been afebrile & infectious work up was unremarkable. There were no electrolyte imbalances. CT head was negative for any masses or intracranial bleeds. TSH/ b12/Folate wnl. Risperidone, zyprexa, and klonopin were given for psychosis. Patient is medically stable. Patient was admitted to medicine for medical work up causing her underlying psychosis. Patient has been afebrile & infectious work up was unremarkable. There were no electrolyte imbalances. CT head was negative for any masses or intracranial bleeds. TSH/ b12/Folate wnl. Risperidone, zyprexa, and klonopin were given for psychosis. Patient is medically stable for transfer to [facility]. 64F w hx HTN, HLD, depression/anxiety, brought in with non-specific headache, nausea, & audio hallucinations. Patient was admitted to medicine for medical work up causing her underlying psychosis. Patient has been afebrile & infectious work up was unremarkable. There were no significant electrolyte imbalances. She did have chronic mild hyponatremia d/t SIADH, Remeron was discontinued and she was fluid restricted. CT head was negative for any masses or intracranial bleeds. TSH/ b12/Folate/RPR wnl. No infectious or metabolic etiology for patient's psychosis was identified. Risperidone, zyprexa, and klonopin were given for psychosis. Patient is medically stable for transfer to [facility] psych.

## 2019-08-19 NOTE — CHART NOTE - NSCHARTNOTEFT_GEN_A_CORE
Pt seen at bedside. It was reported that pt was having audio hallucinations with associated anxiety. Pt was hearing voices coming from the television that were saying, " they were going to take her son away, kill her and take her out of the hospital." She did not have any thoughts of hurting herself or others. Once the TV was turned off, she stopped hearing the voices and is not currently hearing voices.     Plan:   -Continue to monitor Pt seen at bedside. It was reported that pt was having auditory hallucinations with associated anxiety. Pt was hearing voices coming from the television that were saying, " they were going to take her son away, kill her and take her out of the hospital." She did not have any thoughts of hurting herself or others. Once the TV was turned off, she stopped hearing the voices and is not currently hearing voices.     Plan:   -Continue to monitor

## 2019-08-19 NOTE — ED BEHAVIORAL HEALTH ASSESSMENT NOTE - DIFFERENTIAL
delayed benzodiazepine withdrawal  r/o delirium due to anticholinergicity? depression with psychosis vs psychosis due to medical condition

## 2019-08-19 NOTE — PROGRESS NOTE BEHAVIORAL HEALTH - SUMMARY
65 y/o [place]  Female,  , domicile with her daughter and son-in-law . Pt with no prior psychiatric admission , but was seen in July (last month) by Psych CL team  for similar presentation at the time suspected Delirium with sx of  paranoia and perceptual disturbances, likely 2/2 delirium due to possibly benzo. withdrawal vs GMC. During medical admission treated with Olanzapine prn and continued on her home medications , restarted on Klonopin as per the last encounter with Psychiatry , sensorium and resolution of paranoia. Pt has hx of depression and was on antidepressants in the past while In [facility] , the family deneis any hx of si/sa, and no prior psychotic sx until last month when she presented to [facility] ED with similar presentation. Today pt presented to ED with nausea and headache , treated symptomatically with Reglan and Tylenol , without any symptom relief. Psychiatry consult called to evaluate pt's psychiatric symptoms of paranoia and +ah.    8/19: Pt. seen and evaluated, AAOX2, calm, cooperative, stated that she is feeling better, stated that at home, she was hearing train voices and seeing 2-3 people who were trying to kill her. Patient stated that currently she is feeling safe in the hospital, denies current AH and VH, denies SI and HI. Denies CAH.  Differential includes:  Delirium 2/2 GMC vs Dementia with psychosis, vs depression with psychotic features vs psychosis due to underlying organic etiology    PLAN:   Continue Risperdal 0.5mg bid if qtc <500  ***Klonopin and Ambien needs to be verified  with the family***   Continue Klonopin 1mg qdaily for now, to avoid any withdrawal sx.   Olanzapine 1.25-2.5 mg q6hrs po/IM  prn for severe agitation if qtc <500  Recommend to r/o underlying organic etiology for acute psychotic sxs.  Collateral needed    Will follow

## 2019-08-19 NOTE — H&P ADULT - PROBLEM SELECTOR PLAN 1
Medical workup currently unremarkable (no infections (CXR, UA), no leukocytosis, no fever, thyroid studies wnl, ct head unremarkable  -Psych following, appreciate recs  -Hold Mirtazapine due to hyponatremia   -continue Risperdal 0.5mg bid and consider titrating it up.   -continue Klonopin 1mg BID   -Olanzapine 1.25mg q6hrs po Medical workup currently unremarkable (no infections (CXR, UA), no leukocytosis, no fever, thyroid studies wnl, ct head unremarkable  -Psych following, appreciate recs  -Hold Mirtazapine due to hyponatremia   -continue Risperdal 0.5mg bid and consider titrating it up.   -continue Klonopin 1mg BID   -Olanzapine 1.25mg q6hrs po prn

## 2019-08-19 NOTE — H&P ADULT - HISTORY OF PRESENT ILLNESS
64F w hx HTN, HLD, depression here today for multiple complaints. Translated by leanne at bedside. stated pt has had headache for past 3 days located superiorly that has been constant. Also associated w dizziness ~room-spinning though pt has had that for months to years, and is already prescribed meclizine. Has had nausea but no vomiting. No fever. She also complains of burning sensation from neck to chest but denies pain or SOB. No cough. Leanne also states that pt has been complaining of hearing voices that aren't there. Does not hear voices this AM. Denies any thought of SI/HI. Grandson states pt complaining of hearing voices that aren't there. Does not hear voices this AM. Denies any thought of SI/HI. Leanne states patient. Was admitted in the past for similar complaints & seen by psych. 64F w hx HTN, HLD, depression here today for multiple complaints. Translated by leanne at bedside, refused  phone. Leanne stated pt has had headache for past 3 days located superiorly that has been constant, 5/10 on pain scale, and no aggravating or alleviating factors. Also associated w dizziness ~room-spinning though pt has had that for months to years, and is already prescribed meclizine. Has had nausea but no vomiting. No fever. She also complains of burning sensation from neck to chest but denies pain or SOB. No cough. Leanne also states that pt has been complaining of hearing voices that aren't there. Does not hear voices this AM. Denies any thought of SI/HI. Grandson states pt complaining of hearing voices that aren't there. Does not hear voices this AM. Denies any thought of SI/HI. Leanne states patient. Was admitted in the past for similar complaints & seen by psych.

## 2019-08-19 NOTE — PROGRESS NOTE BEHAVIORAL HEALTH - RISK ASSESSMENT
Risk given; acute psychosis    She has protective factors, free of si/hi/i/p, no cah , has supportive family.      Will continue to assess

## 2019-08-19 NOTE — H&P ADULT - ATTENDING COMMENTS
Psychosis, no evidence of infection, CT head unremarkable, TSH/B12/Folate wnl, c/w Risperidone and Zyprexa prn, Psych f/up   Depression/anxiety, c/w Klonopin, hold Mirtazapine d/t hyponatremia   Mild hyponatremia, likely hypovolemic hyponatremia, s/p IV hydration, monitor Na

## 2019-08-19 NOTE — PROGRESS NOTE BEHAVIORAL HEALTH - NSBHFUPINTERVALHXFT_PSY_A_CORE
Marcy Interpretor ID# [id number]    Pt. seen and evaluated, AAOX2, calm, cooperative, stated that she is feeling better, stated that at home, she was hearing train voices and there were 2-3 people who were trying to kill her. Patient stated that currently she is feeling safe in the hospital, denies current AH and VH, denies SI and HI.         As per collateral at bedside:  Patient was sacred. leaving the house and was hearing voices

## 2019-08-19 NOTE — ED BEHAVIORAL HEALTH ASSESSMENT NOTE - RISK ASSESSMENT
risk for harm due to confusion pt is acute risk given acute psychosis and inability to participate in safe discharge. She has protective factors, free of si/hi/i/p, no cah , has supportive family.

## 2019-08-19 NOTE — H&P ADULT - NSHPPHYSICALEXAM_GEN_ALL_CORE
PE  GEN: laying in bed in no acute distress  HEENT: PERRLA, no cervical LAD, Neck supple. MMM.   CV: nl s1, s2, no Murmurs, rubs, gallops, RRR  Lung: CTA b/l  GI: Nontender to palpation, soft, normoactive BS, no masses  Ext: no edema, cyanosis, or clubbing Vital Signs Last 24 Hrs  T(C): 36.6 (19 Aug 2019 15:12), Max: 37.2 (19 Aug 2019 06:35)  T(F): 97.8 (19 Aug 2019 15:12), Max: 98.9 (19 Aug 2019 06:35)  HR: 84 (19 Aug 2019 15:12) (63 - 90)  BP: 94/68 (19 Aug 2019 15:12) (94/68 - 152/100)  BP(mean): --  RR: 17 (19 Aug 2019 15:12) (17 - 20)  SpO2: 100% (19 Aug 2019 15:12) (97% - 100%)

## 2019-08-19 NOTE — DISCHARGE NOTE PROVIDER - CARE PROVIDER_API CALL
Alexia Elam)  Psychiatry  [facility], [address] [address] [place] [address] [address] [place] [address]  Phone: [phone]  Fax: [phone]  Follow Up Time:

## 2019-08-19 NOTE — ED BEHAVIORAL HEALTH ASSESSMENT NOTE - HPI (INCLUDE ILLNESS QUALITY, SEVERITY, DURATION, TIMING, CONTEXT, MODIFYING FACTORS, ASSOCIATED SIGNS AND SYMPTOMS)
65 y/o Female with possible PPH of depression/anxiety, PMH of HTN, HLD, hyponatremia presents with abdominal discomfort which describes as burning/bloating sensation with associated nausea and no vomiting x 1 week. Psych c/s for new onset paranoia, AH/VH, confusion.     Patient AOx0-1 on exam, does not engage with writer, spoke to patient in native language - cannot confirm AH/VH on exam but patient errantly praying, appears disoriented, fearful, tachypneic and uncomfortable. Waxing/waning per staff and family.     Per grandson at bedside and family over phone [phone]: patient has no history of confusion, memory loss, AH/VH, delusions or paranoia. Had been started on mirtazapine and clonazepam in [facility], had been on the former for sleep/anxiety/headaches(?) for 15y, had been on klonopin (dispan brand name in [facility]) 2mg qHS for several months, had been taking it every night. No prior suicide attempts, psych hospitalizations, never seen psychiatrist. At baseline state patient is AOx3. [place]oved [place] ago. No known EtOH use/abuse. Reviewed pictures of all meds patient has been taking at home which are all continued from MD's in [facility]. Also used to take b1/b6/b12 vials regularly "for her stomach". Patient here has been increasingly paranoid, worried that people are trying to kill her, having illusions from noises and words in surrounding. 65 y/o Thai  Female,  , domicile with her daughter and son-in-law . Pt with no prior psychiatric admission , but was seen in July (last month) by Psych CL team  for similar presentation at the time suspected Delirium with sx of  paranoia and perceptual disturbances, likely 2/2 delirium due to possibly benzo. withdrawal vs GMC. During medical admission treated with Olanzapine prn and continued on her home medications , restarted on Klonopin as per the last encounter with Psychiatry , sensorium and resolution of paranoia. Pt has hx of depression and was on antidepressants in the past while In [facility] , the family deneis any hx of si/sa, and no prior psychotic sx until last month when she presented to [facility] ED with similar presentation. Today pt presented to ED with nausea and headache , treated symptomatically with Reglan and Tylenol , without any symptom relief. Psychiatry consult called to evaluate pt's psychiatric symptoms of paranoia and +ah.    Patient is primarily Thai speaking , declines [place] Interperter and prefers for her son-in-law to translate , at one point holding his hand tightly , afraid for him to leave her. pt is difficult to interview , due to distress, she is observed repeatedly trying to vomit , although nothing is coming up and robbing her chest pointing she has pain . Patient presents with several somatic complaints , stating she has a headache, than attempting to pull her hair, she points to her eyes and attempting to pull on her eyelids, then rubs her chest . She is reporting hearing voices , states she is hearing a lot of voices , stating the voices are announcing her death on the speaker. When asked if the voices telling her to hurt herself she denies, but says the Doctors wants to kill her. She is complaining she can't close her eyes afraid to do so "someone will come to hurt me". She denies any vh. She denies feeling depressed but says she is feeling scared and afraid. No other mood symptoms observed including emma.    Per son-in -law and grandson  at bedside  patient has no history of confusion, memory loss,but at baseline aaox2 , and at baseline does not know the calender . She has no prior   AH/VH, delusions or paranoia, until last month presenting with paranoia and +ah. Had been started on medication ?in [facility], for 15y, as per the prior record  had been on klonopin (dispan brand name in [facility]) 2mg qHS for several months, had been taking it every night. No prior suicide attempts, psych hospitalizations, never seen psychiatrist. [place]. No known EtOH use/abuse. The son-in-law reports since leaving the hospital 2 weeks ago pt continued to have ah+ , gets anxious and afraid of any sound. She has been attempting to ran out of the house, but is unable on her feet and fell last week. She requires assistance with ambulating , someone is always assisting her to the bathroom otherwise she spends the entire day in bed. Pt has been expressing paranoia that someone is coming to get her. She reports hearing many voices to the family and has been observing crying a lot. The pt's daughter has been fully assisting pt with ADL'S as pt is unable to complete it in the recent weeks , including she is being fed, being showered and her daughter chnages her clothes. Urinary and bowel continent. The family brought a bag of medications which they say all bottles are in the bag , medications that she takes daily, including Risperdal 0.5mg bid, Mirtazapine 15mg qhs . The family also state she takes sleeping medications but they could not verify the name wether it's Klonopin or Ambien  but state she only takes it sometimes. Pt has not been sleeping well, wakes up every 1/2 hour.  istop checked: Reference #: [id number] Ambien 5mg last dispensed on 8/6/19 and Klonopin 1mg on 7/25 only 10 day supply.

## 2019-08-19 NOTE — ED BEHAVIORAL HEALTH ASSESSMENT NOTE - SUMMARY
65 y/o Female with possible PPH of depression/anxiety, PMH of HTN, HLD, hyponatremia presents with abdominal discomfort which describes as burning/bloating sensation with associated nausea and no vomiting x 1 week. Psych c/s for new onset paranoia, AH/VH, confusion.     Patient exhibits delirium with waxing/waning paranoia/hallucinations/sensorium which may be 2/2 delayed clonazepam withdrawal given long half-life of this med, no tremors but N/V, possible headaches (pt clutching head), confusion and hallucinations present. Would restart on klonopin with CIWA for now, can use zyprexa PRN if necessary for agitation. Also may consider delirium 2/2 meclizine use as patient has gotten some PRNs of this. Given history from family unlikely to be dementia as patient's condition appears to have started acutely. On mirtazapine at home but would hold off on restarting this for now. Discussed risks/benefits of above meds with family, amenable to use.     Na stabilizing per primary team. CThead wnl. Would f/u b12, folate, RPR. 65 y/o [place]  Female,  , domicile with her daughter and son-in-law . Pt with no prior psychiatric admission , but was seen in July (last month) by Psych CL team  for similar presentation at the time suspected Delirium with sx of  paranoia and perceptual disturbances, likely 2/2 delirium due to possibly benzo. withdrawal vs GMC. During medical admission treated with Olanzapine prn and continued on her home medications , restarted on Klonopin as per the last encounter with Psychiatry , sensorium and resolution of paranoia. Pt has hx of depression and was on antidepressants in the past while In [facility] , the family deneis any hx of si/sa, and no prior psychotic sx until last month when she presented to [facility] ED with similar presentation. Today pt presented to ED with nausea and headache , treated symptomatically with Reglan and Tylenol , without any symptom relief. Psychiatry consult called to evaluate pt's psychiatric symptoms of paranoia and +ah.    Patient is presenting with psychotic symptoms , no prior psychotic sx other than last month during medical admission , which was felt was due to underlying medical etiology with waxing and waning sensorium . UA AND ucx IS PENDING TO R/O organicity for the onset of psychosis. follow up with the UA . Pt at this time requires further medical investigation and treatment of hyponatremia and psychiatric treatment . She is medically hospitalised and will be followe dby CL team .

## 2019-08-19 NOTE — DISCHARGE NOTE PROVIDER - NSDCCPCAREPLAN_GEN_ALL_CORE_FT
PRINCIPAL DISCHARGE DIAGNOSIS  Diagnosis: Psychosis  Assessment and Plan of Treatment: You were treated for hallucinations with antipsychotic medications. PRINCIPAL DISCHARGE DIAGNOSIS  Diagnosis: Psychosis  Assessment and Plan of Treatment: You were treated for hallucinations with antipsychotic medications.      SECONDARY DISCHARGE DIAGNOSES  Diagnosis: Hyponatremia  Assessment and Plan of Treatment: Mirtazapine was discontinued. Recommend fluid restriction.

## 2019-08-19 NOTE — ED BEHAVIORAL HEALTH ASSESSMENT NOTE - PSYCHIATRIC ISSUES AND PLAN (INCLUDE STANDING AND PRN MEDICATION)
Hold Mirtazapine due to hyponatremia (although less likely to cause hyponatremia thn SSRI/SNRI ), continue Risperdal 0.5mg bid and consider titrating it up. Klonopin and Ambien needs to be verified  with the family whiich of these pt takes as per istop takes both family is not sure ( they need to bring the bottles to the hospital).  continue Klonopin 1mg qdaily for now, to avoid any withdrawl sx. until further verified if pt is on this since discharge from the hospital. Olanzapin 1.25mg q6hrs po

## 2019-08-19 NOTE — ED BEHAVIORAL HEALTH ASSESSMENT NOTE - DESCRIPTION
see hpi she is anxious, restless.  ua is pending    Vital Signs Last 24 Hrs  T(C): 36.8 (19 Aug 2019 03:00), Max: 37 (18 Aug 2019 19:18)  T(F): 98.2 (19 Aug 2019 03:00), Max: 98.6 (18 Aug 2019 19:18)  HR: 88 (19 Aug 2019 03:00) (88 - 90)  BP: 148/88 (19 Aug 2019 03:00) (142/106 - 152/100)  BP(mean): --  RR: 18 (19 Aug 2019 03:00) (18 - 20)  SpO2: 97% (19 Aug 2019 03:00) (97% - 99%)    CT of the head : negative for acute mass, or intracranial hemorrhage as per hpi

## 2019-08-19 NOTE — ED BEHAVIORAL HEALTH ASSESSMENT NOTE - DETAILS
malaise nausea, abd pain the handoff put on the Tdrive discussed with DR Villeda eyes burning chest pain nausea,

## 2019-08-19 NOTE — H&P ADULT - NSHPLABSRESULTS_GEN_ALL_CORE
LABS:                         10.9   9.85  )-----------( 350      ( 18 Aug 2019 23:02 )             33.9         132<L>  |  95<L>  |  6<L>  ----------------------------<  96  3.8   |  21<L>  |  0.66    Ca    9.5      18 Aug 2019 23:02  Phos  4.0       Mg     1.9         TPro  8.0  /  Alb  4.4  /  TBili  0.4  /  DBili  x   /  AST  23  /  ALT  23  /  AlkPhos  64        Urinalysis Basic - ( 19 Aug 2019 05:30 )    Color: COLORLESS / Appearance: CLEAR / S.008 / pH: 7.5  Gluc: NEGATIVE / Ketone: NEGATIVE  / Bili: NEGATIVE / Urobili: NORMAL   Blood: NEGATIVE / Protein: NEGATIVE / Nitrite: NEGATIVE   Leuk Esterase: NEGATIVE / RBC: x / WBC x   Sq Epi: x / Non Sq Epi: x / Bacteria: x            RADIOLOGY, EKG & ADDITIONAL TESTS: Reviewed. LABS:                         10.9   9.85  )-----------( 350      ( 18 Aug 2019 23:02 )             33.9     08-18    132<L>  |  95<L>  |  6<L>  ----------------------------<  96  3.8   |  21<L>  |  0.66    Ca    9.5      18 Aug 2019 23:02  Phos  4.0       Mg     1.9         TPro  8.0  /  Alb  4.4  /  TBili  0.4  /  DBili  x   /  AST  23  /  ALT  23  /  AlkPhos  64        Urinalysis Basic - ( 19 Aug 2019 05:30 )    Color: COLORLESS / Appearance: CLEAR / S.008 / pH: 7.5  Gluc: NEGATIVE / Ketone: NEGATIVE  / Bili: NEGATIVE / Urobili: NORMAL   Blood: NEGATIVE / Protein: NEGATIVE / Nitrite: NEGATIVE   Leuk Esterase: NEGATIVE / RBC: x / WBC x   Sq Epi: x / Non Sq Epi: x / Bacteria: x            RADIOLOGY, EKG & ADDITIONAL TESTS:   CT head reviewed by me:   No acute intracranial hemorrhage, large cortical infarct or mass effect.   If clinically indicated, short-term follow-up or MRI may be obtained for   further evaluation.    CXR reviewed: clear lungs     EKG tracing reviewed and interpreted by me: PADMINI

## 2019-08-19 NOTE — H&P ADULT - ASSESSMENT
64 yr old female with hx of depression, HTN and HLD presents w/ non-specific headache, nausea, & audio hallucinations. likely psychosis, will r/o ic mass vs lytes imbalance vs thyroid d/o.

## 2019-08-19 NOTE — PROGRESS NOTE BEHAVIORAL HEALTH - NSBHCHARTREVIEWVS_PSY_A_CORE FT
ICU Vital Signs Last 24 Hrs  T(C): 36.6 (19 Aug 2019 15:12), Max: 37.2 (19 Aug 2019 06:35)  T(F): 97.8 (19 Aug 2019 15:12), Max: 98.9 (19 Aug 2019 06:35)  HR: 84 (19 Aug 2019 15:12) (63 - 90)  BP: 94/68 (19 Aug 2019 15:12) (94/68 - 152/100)  BP(mean): --  ABP: --  ABP(mean): --  RR: 17 (19 Aug 2019 15:12) (17 - 20)  SpO2: 100% (19 Aug 2019 15:12) (97% - 100%)

## 2019-08-20 DIAGNOSIS — E87.1 HYPO-OSMOLALITY AND HYPONATREMIA: ICD-10-CM

## 2019-08-20 LAB
ANION GAP SERPL CALC-SCNC: 15 MMO/L — HIGH (ref 7–14)
BACTERIA UR CULT: SIGNIFICANT CHANGE UP
BUN SERPL-MCNC: 10 MG/DL — SIGNIFICANT CHANGE UP (ref 7–23)
CALCIUM SERPL-MCNC: 9.1 MG/DL — SIGNIFICANT CHANGE UP (ref 8.4–10.5)
CHLORIDE SERPL-SCNC: 95 MMOL/L — LOW (ref 98–107)
CO2 SERPL-SCNC: 20 MMOL/L — LOW (ref 22–31)
CREAT SERPL-MCNC: 0.8 MG/DL — SIGNIFICANT CHANGE UP (ref 0.5–1.3)
GLUCOSE SERPL-MCNC: 125 MG/DL — HIGH (ref 70–99)
HCT VFR BLD CALC: 36.8 % — SIGNIFICANT CHANGE UP (ref 34.5–45)
HCV AB S/CO SERPL IA: 0.06 S/CO — SIGNIFICANT CHANGE UP (ref 0–0.99)
HCV AB SERPL-IMP: SIGNIFICANT CHANGE UP
HGB BLD-MCNC: 11.4 G/DL — LOW (ref 11.5–15.5)
MAGNESIUM SERPL-MCNC: 1.9 MG/DL — SIGNIFICANT CHANGE UP (ref 1.6–2.6)
MCHC RBC-ENTMCNC: 25.9 PG — LOW (ref 27–34)
MCHC RBC-ENTMCNC: 31 % — LOW (ref 32–36)
MCV RBC AUTO: 83.6 FL — SIGNIFICANT CHANGE UP (ref 80–100)
NRBC # FLD: 0 K/UL — SIGNIFICANT CHANGE UP (ref 0–0)
PHOSPHATE SERPL-MCNC: 4.4 MG/DL — SIGNIFICANT CHANGE UP (ref 2.5–4.5)
PLATELET # BLD AUTO: 319 K/UL — SIGNIFICANT CHANGE UP (ref 150–400)
PMV BLD: 9.2 FL — SIGNIFICANT CHANGE UP (ref 7–13)
POTASSIUM SERPL-MCNC: 3.9 MMOL/L — SIGNIFICANT CHANGE UP (ref 3.5–5.3)
POTASSIUM SERPL-SCNC: 3.9 MMOL/L — SIGNIFICANT CHANGE UP (ref 3.5–5.3)
RBC # BLD: 4.4 M/UL — SIGNIFICANT CHANGE UP (ref 3.8–5.2)
RBC # FLD: 13.2 % — SIGNIFICANT CHANGE UP (ref 10.3–14.5)
SODIUM SERPL-SCNC: 130 MMOL/L — LOW (ref 135–145)
SPECIMEN SOURCE: SIGNIFICANT CHANGE UP
WBC # BLD: 8.07 K/UL — SIGNIFICANT CHANGE UP (ref 3.8–10.5)
WBC # FLD AUTO: 8.07 K/UL — SIGNIFICANT CHANGE UP (ref 3.8–10.5)

## 2019-08-20 PROCEDURE — 93010 ELECTROCARDIOGRAM REPORT: CPT

## 2019-08-20 PROCEDURE — 99233 SBSQ HOSP IP/OBS HIGH 50: CPT

## 2019-08-20 PROCEDURE — 99232 SBSQ HOSP IP/OBS MODERATE 35: CPT | Mod: GC

## 2019-08-20 RX ORDER — RISPERIDONE 4 MG/1
0.5 TABLET ORAL DAILY
Refills: 0 | Status: DISCONTINUED | OUTPATIENT
Start: 2019-08-20 | End: 2019-08-21

## 2019-08-20 RX ORDER — RISPERIDONE 4 MG/1
0.75 TABLET ORAL AT BEDTIME
Refills: 0 | Status: DISCONTINUED | OUTPATIENT
Start: 2019-08-20 | End: 2019-08-21

## 2019-08-20 RX ORDER — METOPROLOL TARTRATE 50 MG
12.5 TABLET ORAL
Refills: 0 | Status: DISCONTINUED | OUTPATIENT
Start: 2019-08-20 | End: 2019-08-21

## 2019-08-20 RX ADMIN — RISPERIDONE 0.75 MILLIGRAM(S): 4 TABLET ORAL at 22:07

## 2019-08-20 RX ADMIN — ENOXAPARIN SODIUM 40 MILLIGRAM(S): 100 INJECTION SUBCUTANEOUS at 11:11

## 2019-08-20 RX ADMIN — Medication 1 MILLIGRAM(S): at 05:43

## 2019-08-20 RX ADMIN — RISPERIDONE 0.5 MILLIGRAM(S): 4 TABLET ORAL at 05:43

## 2019-08-20 RX ADMIN — PANTOPRAZOLE SODIUM 40 MILLIGRAM(S): 20 TABLET, DELAYED RELEASE ORAL at 06:17

## 2019-08-20 RX ADMIN — Medication 12.5 MILLIGRAM(S): at 18:49

## 2019-08-20 RX ADMIN — Medication 81 MILLIGRAM(S): at 11:11

## 2019-08-20 RX ADMIN — OLANZAPINE 2.5 MILLIGRAM(S): 15 TABLET, FILM COATED ORAL at 11:10

## 2019-08-20 RX ADMIN — POLYETHYLENE GLYCOL 3350 17 GRAM(S): 17 POWDER, FOR SOLUTION ORAL at 11:11

## 2019-08-20 RX ADMIN — Medication 145 MILLIGRAM(S): at 11:11

## 2019-08-20 RX ADMIN — Medication 1 MILLIGRAM(S): at 18:49

## 2019-08-20 NOTE — PROGRESS NOTE BEHAVIORAL HEALTH - NSBHCHARTREVIEWVS_PSY_A_CORE FT
ICU Vital Signs Last 24 Hrs  T(C): 36.6 (19 Aug 2019 15:12), Max: 37.2 (19 Aug 2019 06:35)  T(F): 97.8 (19 Aug 2019 15:12), Max: 98.9 (19 Aug 2019 06:35)  HR: 84 (19 Aug 2019 15:12) (63 - 90)  BP: 94/68 (19 Aug 2019 15:12) (94/68 - 152/100)  BP(mean): --  ABP: --  ABP(mean): --  RR: 17 (19 Aug 2019 15:12) (17 - 20)  SpO2: 100% (19 Aug 2019 15:12) (97% - 100%) ICU Vital Signs Last 24 Hrs  T(C): 36.7 (20 Aug 2019 14:07), Max: 36.7 (20 Aug 2019 14:07)  T(F): 98.1 (20 Aug 2019 14:07), Max: 98.1 (20 Aug 2019 14:07)  HR: 99 (20 Aug 2019 14:07) (84 - 110)  BP: 105/78 (20 Aug 2019 14:07) (94/68 - 107/78)  BP(mean): --  ABP: --  ABP(mean): --  RR: 17 (20 Aug 2019 14:07) (16 - 17)  SpO2: 97% (20 Aug 2019 14:07) (97% - 100%)

## 2019-08-20 NOTE — PROGRESS NOTE ADULT - PROBLEM SELECTOR PLAN 1
Medical workup currently unremarkable (no infections (CXR, UA), no leukocytosis, no fever, thyroid studies wnl, ct head unremarkable  -Psych following, appreciate recs  -Hold Mirtazapine due to hyponatremia   -continue Risperdal 0.5mg bid   -continue Klonopin 1mg BID   -Olanzapine 1.25mg q6hrs po prn

## 2019-08-20 NOTE — PROGRESS NOTE BEHAVIORAL HEALTH - NSBHCHARTREVIEWLAB_PSY_A_CORE FT
10.9   9.85  )-----------( 350      ( 18 Aug 2019 23:02 )             33.9 11.4   8.07  )-----------( 319      ( 20 Aug 2019 05:00 )             36.8

## 2019-08-20 NOTE — CHART NOTE - NSCHARTNOTEFT_GEN_A_CORE
EKG obtained for antipsychotic medication continuation    QTc 456 ms. Not prolonged    Bradley Ribeiro, PGY-1  Internal Medicine  Pager [phone] / [phone]

## 2019-08-20 NOTE — PROGRESS NOTE ADULT - PROBLEM SELECTOR PLAN 2
likely 2/2 to SIADH 2/2 to antispychotics. worsened today (na 130)  -fluid restriction likely 2/2 to SIADH 2/2 to antispychotics vs anxiety/stress vs underlying schizo. worsened today (na 130)  -fluid restriction

## 2019-08-20 NOTE — PROGRESS NOTE BEHAVIORAL HEALTH - OTHER
anxious, minimally engaging in bed delusional that someone is trying to kill her impoverished anxious, minimally engaging, crying pt. crying, not engaging

## 2019-08-20 NOTE — PROGRESS NOTE ADULT - ASSESSMENT
64 yr old female with hx of depression, HTN and HLD presents w/ non-specific headache, nausea, & audio hallucinations. likely psychosis, r/o'ed ic mass vs lytes imbalance vs thyroid d/o.

## 2019-08-20 NOTE — PROGRESS NOTE BEHAVIORAL HEALTH - SUMMARY
65 y/o Persian  Female,  , domicile with her daughter and son-in-law . Pt with no prior psychiatric admission , but was seen in July (last month) by Psych CL team  for similar presentation at the time suspected Delirium with sx of  paranoia and perceptual disturbances, likely 2/2 delirium due to possibly benzo. withdrawal vs GMC. During medical admission treated with Olanzapine prn and continued on her home medications , restarted on Klonopin as per the last encounter with Psychiatry , sensorium and resolution of paranoia. Pt has hx of depression and was on antidepressants in the past while In [facility] , the family deneis any hx of si/sa, and no prior psychotic sx until last month when she presented to [facility] ED with similar presentation. Today pt presented to ED with nausea and headache , treated symptomatically with Reglan and Tylenol , without any symptom relief. Psychiatry consult called to evaluate pt's psychiatric symptoms of paranoia and +ah.    8/19: Pt. seen and evaluated, AAOX2, calm, cooperative, stated that she is feeling better, stated that at home, she was hearing train voices and seeing 2-3 people who were trying to kill her. Patient stated that currently she is feeling safe in the hospital, denies current AH and VH, denies SI and HI. Denies CAH.  Differential includes:  Delirium 2/2 GMC vs Dementia with psychosis, vs depression with psychotic features vs psychosis due to underlying organic etiology    8/19: Pt. seen and evaluated, AAOX2, appeared scared, anxious, pt. is acute psychotic and delusional , thinking that someone is trying to kill her. Patient will need an inpatient psychiatric admission for acute psychiatric decompensation. Patient needs further stabilization.      PLAN:   Continue Risperdal 0.5mg bid if qtc <500  ***Klonopin and Ambien needs to be verified  with the family***   Continue Klonopin 1mg qdaily for now, to avoid any withdrawal sx.   Olanzapine 1.25-2.5 mg q6hrs po/IM  prn for severe agitation if qtc <500  Recommend to r/o underlying organic etiology for acute psychotic sxs.  Collateral needed    Will follow 63 y/o Italian  Female,  , domicile with her daughter and son-in-law . Pt with no prior psychiatric admission , but was seen in July (last month) by Psych CL team  for similar presentation at the time suspected Delirium with sx of  paranoia and perceptual disturbances, likely 2/2 delirium due to possibly benzo. withdrawal vs GMC. During medical admission treated with Olanzapine prn and continued on her home medications , restarted on Klonopin as per the last encounter with Psychiatry , sensorium and resolution of paranoia. Pt has hx of depression and was on antidepressants in the past while In [facility] , the family deneis any hx of si/sa, and no prior psychotic sx until last month when she presented to [facility] ED with similar presentation. Today pt presented to ED with nausea and headache , treated symptomatically with Reglan and Tylenol , without any symptom relief. Psychiatry consult called to evaluate pt's psychiatric symptoms of paranoia and +ah.    8/19: Pt. seen and evaluated, AAOX2, calm, cooperative, stated that she is feeling better, stated that at home, she was hearing train voices and seeing 2-3 people who were trying to kill her. Patient stated that currently she is feeling safe in the hospital, denies current AH and VH, denies SI and HI. Denies CAH.  Differential includes:  Delirium 2/2 GMC vs Dementia with psychosis, vs depression with psychotic features vs psychosis due to underlying organic etiology    8/19: Pt. seen and evaluated, AAOX2, appeared scared, anxious, pt. is acute psychotic and delusional , thinking that someone is trying to kill her. Patient will need an inpatient psychiatric admission for acute psychiatric decompensation. Patient needs further stabilization.      PLAN:   Increase Risperdal 0.5mg QAM and 1mg hs,  if qtc <500  ***Klonopin and Ambien needs to be verified  with the family***   Continue Klonopin 1mg bid.   Olanzapine 2.5 mg q6hrs po/IM  prn for severe agitation if qtc <500  Recommend to r/o underlying organic etiology for acute psychotic sxs.      Will follow 63 y/o Wol[place]  Female,  , domicile with her daughter and son-in-law . Pt with no prior psychiatric admission , but was seen in July (last month) by Psych CL team  for similar presentation at the time suspected Delirium with sx of  paranoia and perceptual disturbances, likely 2/2 delirium due to possibly benzo. withdrawal vs GMC. During medical admission treated with Olanzapine prn and continued on her home medications , restarted on Klonopin as per the last encounter with Psychiatry , sensorium and resolution of paranoia. Pt has hx of depression and was on antidepressants in the past while In [facility] , the family deneis any hx of si/sa, and no prior psychotic sx until last month when she presented to [facility] ED with similar presentation. Today pt presented to ED with nausea and headache , treated symptomatically with Reglan and Tylenol , without any symptom relief. Psychiatry consult called to evaluate pt's psychiatric symptoms of paranoia and +ah.    8/19: Pt. seen and evaluated, AAOX2, calm, cooperative, stated that she is feeling better, stated that at home, she was hearing train voices and seeing 2-3 people who were trying to kill her. Patient stated that currently she is feeling safe in the hospital, denies current AH and VH, denies SI and HI. Denies CAH.  Differential includes:  Delirium 2/2 GMC vs Dementia with psychosis, vs depression with psychotic features vs psychosis due to underlying organic etiology    8/20: Pt. seen and evaluated, AAOX2, appeared scared, anxious, pt. is acutely psychotic and delusional , thinking that someone is trying to kill her. Patient will need an inpatient psychiatric admission for acute psychiatric decompensation. Patient needs further stabilization.  Discussed with family.     PLAN:   -   Increase Risperdal 0.5mg QAM and 0.75 mg hs,  if qtc <500  ***Klonopin and Ambien needs to be verified  with the family***   Continue Klonopin 1mg bid.   Olanzapine 2.5 mg q6hrs po/IM  prn for severe agitation if qtc <500  Recommend to r/o underlying organic etiology for acute psychotic sxs.      Will follow 63 y/o Tajik  Female,  , domicile with her daughter and son-in-law . Pt with no prior psychiatric admission , but was seen in July (last month) by Psych CL team  for similar presentation at the time suspected Delirium with sx of  paranoia and perceptual disturbances, likely 2/2 delirium due to possibly benzo. withdrawal vs GMC. During medical admission treated with Olanzapine prn and continued on her home medications , restarted on Klonopin as per the last encounter with Psychiatry , sensorium and resolution of paranoia. Pt has hx of depression and was on antidepressants in the past while In [facility] , the family deneis any hx of si/sa, and no prior psychotic sx until last month when she presented to [facility] ED with similar presentation. Today pt presented to ED with nausea and headache , treated symptomatically with Reglan and Tylenol , without any symptom relief. Psychiatry consult called to evaluate pt's psychiatric symptoms of paranoia and +ah.    8/19: Pt. seen and evaluated, AAOX2, calm, cooperative, stated that she is feeling better, stated that at home, she was hearing train voices and seeing 2-3 people who were trying to kill her. Patient stated that currently she is feeling safe in the hospital, denies current AH and VH, denies SI and HI. Denies CAH.  Differential includes:  Delirium 2/2 GMC vs Dementia with psychosis, vs depression with psychotic features vs psychosis due to underlying organic etiology    8/20: Pt. seen and evaluated, AAOX2, appeared scared, anxious, pt. is acutely psychotic and delusional , thinking that someone is trying to kill her. Patient will need an inpatient psychiatric admission for acute psychiatric decompensation. Patient needs further stabilization.  Discussed with family.     PLAN:   Increase Risperdal 0.5mg QAM and 0.75 mg hs,  if qtc <500  ***Klonopin and Ambien needs to be verified  with the family***   Continue Klonopin 1mg bid.   Olanzapine 2.5 mg q6hrs po/IM  prn for severe agitation if qtc <500  Recommend to r/o underlying organic etiology for acute psychotic sxs.      Will follow

## 2019-08-20 NOTE — PROGRESS NOTE BEHAVIORAL HEALTH - NSBHFUPINTERVALHXFT_PSY_A_CORE
Patient recevied PRN Zyprexa x2 on 8/19.    Interpretor ID # [id number], pt. seen and evaluated, was anxious, crying, stated that she is feeling scared someone is trying to kill her. Stated that "those people are outside". When asked about hallucination, pt started crying and wa giving vague answers.  No SI or HO elicited.    Spoke with Mr. [phone]: He reported that patient has been decompensating psychiatrically, not at her baseline and agreed with the admission. he stated that maybe his wife phone got lost.  Tried calling pt.'s daughter Tierney [phone] through , she was not available. Patient recevied PRN Zyprexa x2 on 8/19.    Interpretor ID # [id number], pt. seen and evaluated, was anxious, crying, stated that she is feeling scared someone is trying to kill her. Stated that "those people are outside". When asked about hallucination, pt started crying and wa giving vague answers.  No SI or HI elicited.    Spoke with pt.'s daughter Ms. Monet with interpretor ID [id number], called on Marck's number (son-in-law): Discussed about current psychiatric regimen, importance of inpatient admission due to acute psychosis.     As per daughter, pt. has no past psychiatric history,     Spoke with Mr. [phone]: He reported that patient has been decompensating psychiatrically, not at her baseline and agreed with the admission. he stated that maybe his wife phone got lost.  Tried calling pt.'s daughter Tierney [phone] through , she was not available. Patient recevied PRN Zyprexa x2 on 8/19.    Interpretor ID # [id number], pt. seen and evaluated, was anxious, crying, stated that she is feeling scared someone is trying to kill her. Stated that "those people are outside". When asked about hallucination, pt started crying and wa giving vague answers.  No SI or HI elicited.    Spoke with pt.'s daughter Ms. Monet with interpretor ID [id number], called on Marck's number (son-in-law): Discussed about current psychiatric regimen, importance of inpatient admission due to acute psychosis.     As per daughter, pt. has no past psychiatric history,     Spoke with Mr. Salgado: [phone]: He reported that patient has been decompensating psychiatrically, not at her baseline and agreed with the admission. he stated that maybe his wife phone got lost.  Tried calling pt.'s daughter Tierney [phone] through , she was not available.    Spoke with pt.'s daughter Ms. Monet, ID [id number], called on Son in law's number:  She stated that patient has no past psychiatric history, discussed about current psychiatric regimen, discussed about acute psychotic sxs, and importance of inpatient admissions. Patient recevied PRN Zyprexa x2 on 8/19.    Interpretor ID # [id number], pt. seen and evaluated, was anxious, crying, stated that she is feeling scared someone is trying to kill her. Stated that "those people are outside" and they all are trying to kill me here.  When asked about hallucination, pt started crying and was giving vague answers.  No SI or HI elicited.    Spoke with pt.'s daughter Ms. Monet with interpretor ID [id number], called on Marck's number (son-in-law): She stated that patient has no past psychiatric history, Discussed about current psychiatric regimen, importance of inpatient admission due to acute psychosis, after an extensive discussion , pt.'s daughter agreed with the plan.          Spoke with Mr. Salgado: [phone]: He reported that patient has been decompensating psychiatrically, not at her baseline and agreed with the admission. he stated that maybe his wife phone got lost.  Tried calling pt.'s daughter Tierney [phone] through , she was not available.    Spoke with pt.'s daughter Ms. Monet, ID [id number], called on Son in law's number:  She stated that patient has no past psychiatric history, discussed about current psychiatric regimen, discussed about acute psychotic sxs, and importance of inpatient admissions. Patient recevied PRN Zyprexa x2 on 8/19.    Interpretor ID # [id number], pt. seen and evaluated, was anxious, crying, stated that she is feeling scared someone is trying to kill her. Stated that "those people are outside" and they all are trying to kill me here.  When asked about hallucination, pt started crying and was giving vague answers.  No SI or HI elicited.        Tried calling pt.'s daughter Tierney [phone] through , she was not available.    Spoke with Mr. Salgado: [phone]: He reported that patient has been decompensating psychiatrically, not at her baseline and agreed with the admission. he stated that maybe his wife phone got lost.    Spoke with pt.'s daughter Ms. Monet with interpretor ID [id number], called on Marck's number (son-in-law): She stated that patient has no past psychiatric history, Discussed about current psychiatric regimen, importance of inpatient admission due to acute psychosis, after an extensive discussion , pt.'s daughter agreed with the plan.

## 2019-08-20 NOTE — PROGRESS NOTE BEHAVIORAL HEALTH - RISK ASSESSMENT
Risk given; acute psychosis    She has protective factors, free of si/hi/i/p, no cah , has supportive family.      Will continue to assess Risk given; acute psychosis    She has protective factors, free of si/hi/i/p, no cah , has supportive family.      Pt. is acutely psychotic ad needs an inpatient admission.

## 2019-08-20 NOTE — PROGRESS NOTE ADULT - ATTENDING COMMENTS
Psychosis, infectious w/up neg, CT head unremarkable, TSH/B12/Folate/RPR wnl, c/w Risperidone and Zyprexa prn, Psych f/up  Depression/anxiety, c/w Klonopin, hold Mirtazapine d/t hyponatremia   Mild hyponatremia d/t SIADH, fluid restriction, monitor Na  DC planning to inpatient psych vs home Psychosis, infectious w/up neg, CT head unremarkable, TSH/B12/Folate/RPR wnl, c/w Risperidone and Zyprexa prn, Psych f/up  Depression/anxiety, c/w Klonopin, hold Mirtazapine d/t hyponatremia   Mild hyponatremia d/t SIADH, fluid restriction, monitor Na  DC planning to inpatient psych, d/c time 34 minutes

## 2019-08-20 NOTE — PROGRESS NOTE ADULT - SUBJECTIVE AND OBJECTIVE BOX
Bob Wells (Kd) PGY-1  Pager: [phone]     Patient is a 64y old  Female who presents with a chief complaint of Headache, nausea, audio hallucinations (19 Aug 2019 21:53)      SUBJECTIVE / OVERNIGHT EVENTS:  Patient had audio hallucinations when her neighbors TV was on too loud. "She heard they are going to take her and her son away from the hospital." It resolved after the TV was turned off.       MEDICATIONS  (STANDING):  aspirin enteric coated 81 milliGRAM(s) Oral daily  clonazePAM  Tablet 1 milliGRAM(s) Oral two times a day  enoxaparin Injectable 40 milliGRAM(s) SubCutaneous daily  fenofibrate Tablet 145 milliGRAM(s) Oral daily  metoprolol tartrate 12.5 milliGRAM(s) Oral two times a day  pantoprazole    Tablet 40 milliGRAM(s) Oral before breakfast  polyethylene glycol 3350 17 Gram(s) Oral daily  risperiDONE   Tablet 0.5 milliGRAM(s) Oral two times a day    MEDICATIONS  (PRN):  acetaminophen   Tablet .. 650 milliGRAM(s) Oral every 6 hours PRN Mild Pain (1 - 3), Moderate Pain (4 - 6)  OLANZapine 2.5 milliGRAM(s) Oral every 6 hours PRN Agitation/psychosis          OBJECTIVE:  Vital Signs Last 24 Hrs  T(C): 36.5 (20 Aug 2019 05:38), Max: 36.6 (19 Aug 2019 15:12)  T(F): 97.7 (20 Aug 2019 05:38), Max: 97.9 (19 Aug 2019 21:17)  HR: 84 (20 Aug 2019 05:38) (84 - 110)  BP: 105/76 (20 Aug 2019 05:38) (94/68 - 107/78)  BP(mean): --  RR: 16 (20 Aug 2019 05:38) (16 - 17)  SpO2: 98% (20 Aug 2019 05:38) (98% - 100%)    PHYSICAL EXAM:  GENERAL: NAD, well-developed  HEAD:  Atraumatic, Normocephalic  EYES: PERRLA, conjunctiva and sclera clear  NECK: Supple, No JVD  CHEST/LUNG: Clear to auscultation bilaterally; No wheeze  HEART: Regular rate and rhythm; No murmurs, rubs, or gallops  ABDOMEN: Soft, Nontender, Nondistended; Bowel sounds present  EXTREMITIES: No clubbing, cyanosis, or edema  SKIN: No rashes or lesions    CAPILLARY BLOOD GLUCOSE        I&O's Summary            LABS:                        11.4   8.07  )-----------( 319      ( 20 Aug 2019 05:00 )             36.8     08-20    130<L>  |  95<L>  |  10  ----------------------------<  125<H>  3.9   |  20<L>  |  0.80    Ca    9.1      20 Aug 2019 05:00  Phos  4.4     08-20  Mg     1.9     08-20    TPro  8.0  /  Alb  4.4  /  TBili  0.4  /  DBili  x   /  AST  23  /  ALT  23  /  AlkPhos  64  08-18          Urinalysis Basic - ( 19 Aug 2019 05:30 )    Color: COLORLESS / Appearance: CLEAR / S.008 / pH: 7.5  Gluc: NEGATIVE / Ketone: NEGATIVE  / Bili: NEGATIVE / Urobili: NORMAL   Blood: NEGATIVE / Protein: NEGATIVE / Nitrite: NEGATIVE   Leuk Esterase: NEGATIVE / RBC: x / WBC x   Sq Epi: x / Non Sq Epi: x / Bacteria: x          RADIOLOGY & ADDITIONAL TESTS: Bob Wells (Kd) PGY-1  Pager: [phone]     Patient is a 64y old  Female who presents with a chief complaint of Headache, nausea, audio hallucinations (19 Aug 2019 21:53)      SUBJECTIVE / OVERNIGHT EVENTS:  Patient had audio hallucinations when her neighbors TV was on too loud. "She heard they are going to take her and her son away from the hospital." It resolved after the TV was turned off.       MEDICATIONS  (STANDING):  aspirin enteric coated 81 milliGRAM(s) Oral daily  clonazePAM  Tablet 1 milliGRAM(s) Oral two times a day  enoxaparin Injectable 40 milliGRAM(s) SubCutaneous daily  fenofibrate Tablet 145 milliGRAM(s) Oral daily  metoprolol tartrate 12.5 milliGRAM(s) Oral two times a day  pantoprazole    Tablet 40 milliGRAM(s) Oral before breakfast  polyethylene glycol 3350 17 Gram(s) Oral daily  risperiDONE   Tablet 0.5 milliGRAM(s) Oral two times a day    MEDICATIONS  (PRN):  acetaminophen   Tablet .. 650 milliGRAM(s) Oral every 6 hours PRN Mild Pain (1 - 3), Moderate Pain (4 - 6)  OLANZapine 2.5 milliGRAM(s) Oral every 6 hours PRN Agitation/psychosis          OBJECTIVE:  Vital Signs Last 24 Hrs  T(C): 36.5 (20 Aug 2019 05:38), Max: 36.6 (19 Aug 2019 15:12)  T(F): 97.7 (20 Aug 2019 05:38), Max: 97.9 (19 Aug 2019 21:17)  HR: 84 (20 Aug 2019 05:38) (84 - 110)  BP: 105/76 (20 Aug 2019 05:38) (94/68 - 107/78)  BP(mean): --  RR: 16 (20 Aug 2019 05:38) (16 - 17)  SpO2: 98% (20 Aug 2019 05:38) (98% - 100%)    PHYSICAL EXAM:  GENERAL: NAD  HEAD:  Atraumatic, Normocephalic  EYES: PERRLA, conjunctiva and sclera clear  NECK: Supple, No JVD  CHEST/LUNG: Clear to auscultation bilaterally; No wheeze  HEART: Regular rate and rhythm; No murmurs, rubs, or gallops  ABDOMEN: Soft, Nontender, Nondistended; Bowel sounds present  EXTREMITIES: No clubbing, cyanosis, or edema  NEURO: AOx2, CN's intact, moves all extremities   SKIN: No rashes or lesions    CAPILLARY BLOOD GLUCOSE        I&O's Summary            LABS:                        11.4   8.07  )-----------( 319      ( 20 Aug 2019 05:00 )             36.8     08-20    130<L>  |  95<L>  |  10  ----------------------------<  125<H>  3.9   |  20<L>  |  0.80    Ca    9.1      20 Aug 2019 05:00  Phos  4.4     08-20  Mg     1.9     08-20    TPro  8.0  /  Alb  4.4  /  TBili  0.4  /  DBili  x   /  AST  23  /  ALT  23  /  AlkPhos  64  08-18          Urinalysis Basic - ( 19 Aug 2019 05:30 )    Color: COLORLESS / Appearance: CLEAR / S.008 / pH: 7.5  Gluc: NEGATIVE / Ketone: NEGATIVE  / Bili: NEGATIVE / Urobili: NORMAL   Blood: NEGATIVE / Protein: NEGATIVE / Nitrite: NEGATIVE   Leuk Esterase: NEGATIVE / RBC: x / WBC x   Sq Epi: x / Non Sq Epi: x / Bacteria: x          RADIOLOGY & ADDITIONAL TESTS:

## 2019-08-21 ENCOUNTER — INPATIENT (INPATIENT)
Facility: HOSPITAL | Age: 64
LOS: 4 days | Discharge: HOME | End: 2019-08-26
Attending: PSYCHIATRY & NEUROLOGY | Admitting: PSYCHIATRY & NEUROLOGY
Payer: MEDICAID

## 2019-08-21 ENCOUNTER — TRANSCRIPTION ENCOUNTER (OUTPATIENT)
Age: 64
End: 2019-08-21

## 2019-08-21 VITALS — RESPIRATION RATE: 17 BRPM | WEIGHT: 118.17 LBS | HEIGHT: 59 IN | TEMPERATURE: 97 F

## 2019-08-21 VITALS
OXYGEN SATURATION: 99 % | HEART RATE: 91 BPM | RESPIRATION RATE: 17 BRPM | SYSTOLIC BLOOD PRESSURE: 102 MMHG | TEMPERATURE: 98 F | DIASTOLIC BLOOD PRESSURE: 71 MMHG

## 2019-08-21 DIAGNOSIS — F33.2 MAJOR DEPRESSIVE DISORDER, RECURRENT SEVERE WITHOUT PSYCHOTIC FEATURES: ICD-10-CM

## 2019-08-21 PROBLEM — F32.9 MAJOR DEPRESSIVE DISORDER, SINGLE EPISODE, UNSPECIFIED: Chronic | Status: ACTIVE | Noted: 2019-08-19

## 2019-08-21 PROCEDURE — 99233 SBSQ HOSP IP/OBS HIGH 50: CPT

## 2019-08-21 PROCEDURE — 99222 1ST HOSP IP/OBS MODERATE 55: CPT

## 2019-08-21 PROCEDURE — 99239 HOSP IP/OBS DSCHRG MGMT >30: CPT

## 2019-08-21 RX ORDER — ASPIRIN/CALCIUM CARB/MAGNESIUM 324 MG
81 TABLET ORAL DAILY
Refills: 0 | Status: DISCONTINUED | OUTPATIENT
Start: 2019-08-21 | End: 2019-08-26

## 2019-08-21 RX ORDER — ERGOCALCIFEROL 1.25 MG/1
50000 CAPSULE ORAL
Refills: 0 | Status: DISCONTINUED | OUTPATIENT
Start: 2019-08-21 | End: 2019-08-26

## 2019-08-21 RX ORDER — OLANZAPINE 15 MG/1
2.5 TABLET, FILM COATED ORAL EVERY 6 HOURS
Refills: 0 | Status: DISCONTINUED | OUTPATIENT
Start: 2019-08-21 | End: 2019-08-26

## 2019-08-21 RX ORDER — ZOLPIDEM TARTRATE 10 MG/1
1 TABLET ORAL
Qty: 0 | Refills: 0 | DISCHARGE

## 2019-08-21 RX ORDER — ACETAMINOPHEN 500 MG
650 TABLET ORAL EVERY 6 HOURS
Refills: 0 | Status: DISCONTINUED | OUTPATIENT
Start: 2019-08-21 | End: 2019-08-26

## 2019-08-21 RX ORDER — POLYETHYLENE GLYCOL 3350 17 G/17G
17 POWDER, FOR SOLUTION ORAL
Qty: 0 | Refills: 0 | DISCHARGE
Start: 2019-08-21

## 2019-08-21 RX ORDER — RISPERIDONE 4 MG/1
1 TABLET ORAL
Qty: 0 | Refills: 0 | DISCHARGE
Start: 2019-08-21

## 2019-08-21 RX ORDER — RISPERIDONE 4 MG/1
0.75 TABLET ORAL AT BEDTIME
Refills: 0 | Status: DISCONTINUED | OUTPATIENT
Start: 2019-08-21 | End: 2019-08-22

## 2019-08-21 RX ORDER — FENOFIBRATE,MICRONIZED 130 MG
200 CAPSULE ORAL DAILY
Refills: 0 | Status: DISCONTINUED | OUTPATIENT
Start: 2019-08-21 | End: 2019-08-21

## 2019-08-21 RX ORDER — MECLIZINE HCL 12.5 MG
1 TABLET ORAL
Qty: 0 | Refills: 0 | DISCHARGE

## 2019-08-21 RX ORDER — RISPERIDONE 4 MG/1
3 TABLET ORAL
Qty: 0 | Refills: 0 | DISCHARGE
Start: 2019-08-21

## 2019-08-21 RX ORDER — METOPROLOL TARTRATE 50 MG
12.5 TABLET ORAL
Refills: 0 | Status: DISCONTINUED | OUTPATIENT
Start: 2019-08-21 | End: 2019-08-26

## 2019-08-21 RX ORDER — CLONAZEPAM 1 MG
1 TABLET ORAL
Refills: 0 | Status: DISCONTINUED | OUTPATIENT
Start: 2019-08-21 | End: 2019-08-26

## 2019-08-21 RX ORDER — ENOXAPARIN SODIUM 100 MG/ML
40 INJECTION SUBCUTANEOUS
Qty: 0 | Refills: 0 | DISCHARGE
Start: 2019-08-21

## 2019-08-21 RX ORDER — PANTOPRAZOLE SODIUM 20 MG/1
40 TABLET, DELAYED RELEASE ORAL
Refills: 0 | Status: DISCONTINUED | OUTPATIENT
Start: 2019-08-21 | End: 2019-08-26

## 2019-08-21 RX ORDER — RISPERIDONE 4 MG/1
0.5 TABLET ORAL DAILY
Refills: 0 | Status: DISCONTINUED | OUTPATIENT
Start: 2019-08-21 | End: 2019-08-23

## 2019-08-21 RX ORDER — ERGOCALCIFEROL 1.25 MG/1
50000 CAPSULE ORAL
Refills: 0 | Status: DISCONTINUED | OUTPATIENT
Start: 2019-08-21 | End: 2019-08-21

## 2019-08-21 RX ORDER — OLANZAPINE 15 MG/1
2.5 TABLET, FILM COATED ORAL ONCE
Refills: 0 | Status: DISCONTINUED | OUTPATIENT
Start: 2019-08-21 | End: 2019-08-26

## 2019-08-21 RX ORDER — OLANZAPINE 15 MG/1
1 TABLET, FILM COATED ORAL
Qty: 0 | Refills: 0 | DISCHARGE
Start: 2019-08-21

## 2019-08-21 RX ORDER — POLYETHYLENE GLYCOL 3350 17 G/17G
17 POWDER, FOR SOLUTION ORAL DAILY
Refills: 0 | Status: DISCONTINUED | OUTPATIENT
Start: 2019-08-21 | End: 2019-08-26

## 2019-08-21 RX ORDER — FOLIC ACID 0.8 MG
1 TABLET ORAL DAILY
Refills: 0 | Status: DISCONTINUED | OUTPATIENT
Start: 2019-08-21 | End: 2019-08-26

## 2019-08-21 RX ORDER — ACETAMINOPHEN 500 MG
2 TABLET ORAL
Qty: 0 | Refills: 0 | DISCHARGE
Start: 2019-08-21

## 2019-08-21 RX ORDER — MIRTAZAPINE 45 MG/1
1 TABLET, ORALLY DISINTEGRATING ORAL
Qty: 0 | Refills: 0 | DISCHARGE

## 2019-08-21 RX ORDER — FENOFIBRATE,MICRONIZED 130 MG
145 CAPSULE ORAL DAILY
Refills: 0 | Status: DISCONTINUED | OUTPATIENT
Start: 2019-08-21 | End: 2019-08-26

## 2019-08-21 RX ADMIN — Medication 145 MILLIGRAM(S): at 11:54

## 2019-08-21 RX ADMIN — PANTOPRAZOLE SODIUM 40 MILLIGRAM(S): 20 TABLET, DELAYED RELEASE ORAL at 06:06

## 2019-08-21 RX ADMIN — ENOXAPARIN SODIUM 40 MILLIGRAM(S): 100 INJECTION SUBCUTANEOUS at 11:54

## 2019-08-21 RX ADMIN — Medication 81 MILLIGRAM(S): at 11:54

## 2019-08-21 RX ADMIN — Medication 1 MILLIGRAM(S): at 06:06

## 2019-08-21 RX ADMIN — POLYETHYLENE GLYCOL 3350 17 GRAM(S): 17 POWDER, FOR SOLUTION ORAL at 11:54

## 2019-08-21 RX ADMIN — Medication 12.5 MILLIGRAM(S): at 06:06

## 2019-08-21 RX ADMIN — RISPERIDONE 0.5 MILLIGRAM(S): 4 TABLET ORAL at 11:54

## 2019-08-21 NOTE — PROGRESS NOTE BEHAVIORAL HEALTH - NSBHCONSULTFOLLOWDETAILS_PSY_A_CORE FT
Needs inpatient admission for stabilization Needs inpatient admission for stabilization  Will be transferred to [facility]

## 2019-08-21 NOTE — PROGRESS NOTE BEHAVIORAL HEALTH - SUMMARY
65 y/o Mongolian  Female,  , domicile with her daughter and son-in-law . Pt with no prior psychiatric admission , but was seen in July (last month) by Psych CL team  for similar presentation at the time suspected Delirium with sx of  paranoia and perceptual disturbances, likely 2/2 delirium due to possibly benzo. withdrawal vs GMC. During medical admission treated with Olanzapine prn and continued on her home medications , restarted on Klonopin as per the last encounter with Psychiatry , sensorium and resolution of paranoia. Pt has hx of depression and was on antidepressants in the past while In [facility] , the family deneis any hx of si/sa, and no prior psychotic sx until last month when she presented to [facility] ED with similar presentation. Today pt presented to ED with nausea and headache , treated symptomatically with Reglan and Tylenol , without any symptom relief. Psychiatry consult called to evaluate pt's psychiatric symptoms of paranoia and +ah.    8/19: Pt. seen and evaluated, AAOX2, calm, cooperative, stated that she is feeling better, stated that at home, she was hearing train voices and seeing 2-3 people who were trying to kill her. Patient stated that currently she is feeling safe in the hospital, denies current AH and VH, denies SI and HI. Denies CAH.  Differential includes:  Delirium 2/2 GMC vs Dementia with psychosis, vs depression with psychotic features vs psychosis due to underlying organic etiology    8/20: Pt. seen and evaluated, AAOX2, appeared scared, anxious, pt. is acutely psychotic and delusional , thinking that someone is trying to kill her. Patient will need an inpatient psychiatric admission for acute psychiatric decompensation. Patient needs further stabilization.  Discussed with family.     PLAN:   Increase Risperdal 0.5mg QAM and 0.75 mg hs,  if qtc <500  ***Klonopin and Ambien needs to be verified  with the family***   Continue Klonopin 1mg bid.   Olanzapine 2.5 mg q6hrs po/IM  prn for severe agitation if qtc <500  Recommend to r/o underlying organic etiology for acute psychotic sxs.      Will follow 63 y/o Amharic  Female,  , domicile with her daughter and son-in-law . Pt with no prior psychiatric admission , but was seen in July (last month) by Psych CL team  for similar presentation at the time suspected Delirium with sx of  paranoia and perceptual disturbances, likely 2/2 delirium due to possibly benzo. withdrawal vs GMC. During medical admission treated with Olanzapine prn and continued on her home medications , restarted on Klonopin as per the last encounter with Psychiatry , sensorium and resolution of paranoia. Pt has hx of depression and was on antidepressants in the past while In [facility] , the family deneis any hx of si/sa, and no prior psychotic sx until last month when she presented to [facility] ED with similar presentation. Today pt presented to ED with nausea and headache , treated symptomatically with Reglan and Tylenol , without any symptom relief. Psychiatry consult called to evaluate pt's psychiatric symptoms of paranoia and +ah.    8/19: Pt. seen and evaluated, AAOX2, calm, cooperative, stated that she is feeling better, stated that at home, she was hearing train voices and seeing 2-3 people who were trying to kill her. Patient stated that currently she is feeling safe in the hospital, denies current AH and VH, denies SI and HI. Denies CAH.  Differential includes:  Delirium 2/2 GMC vs Dementia with psychosis, vs depression with psychotic features vs psychosis due to underlying organic etiology    8/20: Pt. seen and evaluated, AAOX2, appeared scared, anxious, pt. is acutely psychotic and delusional , thinking that someone is trying to kill her. Patient will need an inpatient psychiatric admission for acute psychiatric decompensation. Patient needs further stabilization.  Discussed with family.     8/21: Patient seen and evaluated, AAOX2, appears anxious, pt. remains acutely psychotic and delusional, thinking that someone is trying to kill her. Denies SI and HI. Patient will need an inpatient psychiatric admission for acute psychiatric decompensation. Patient needs further stabilization and treatment.      PLAN:   Continue Risperdal 0.5mg QAM and 0.75 mg hs,  if qtc <500  Continue Klonopin 1mg bid.   Olanzapine 2.5 mg q6hrs po/IM  prn for severe agitation if qtc <500  Monitor EKG for qtc      Discussed with Dr. Haro.

## 2019-08-21 NOTE — PROGRESS NOTE ADULT - ATTENDING COMMENTS
Psychosis, infectious w/up neg, CT head unremarkable, TSH/B12/Folate/RPR wnl, c/w Risperidone and Zyprexa prn, Psych f/up  Depression/anxiety, c/w Klonopin, hold Mirtazapine d/t hyponatremia   Chronic hyponatremia d/t SIADH, fluid restriction, monitor Na  DC planning to inpatient psych, d/c time 34 minutes

## 2019-08-21 NOTE — PROGRESS NOTE BEHAVIORAL HEALTH - NSBHFUPINTERVALHXFT_PSY_A_CORE
Patient recevied PRN Zyprexa x1 on 8/3.    Interpretor ID # [id number], pt. seen and evaluated, was anxious, crying, stated that she is feeling scared someone is trying to kill her. Stated that "those people are outside" and they all are trying to kill me here.  When asked about hallucination, pt started crying and was giving vague answers.  No SI or HI elicited.        Tried calling pt.'s daughter Tierney [phone] through , she was not available.    Spoke with Mr. Salgado: [phone]: He reported that patient has been decompensating psychiatrically, not at her baseline and agreed with the admission. he stated that maybe his wife phone got lost.    Spoke with pt.'s daughter Ms. Monet with interpretor ID [id number], called on Marck's number (son-in-law): She stated that patient has no past psychiatric history, Discussed about current psychiatric regimen, importance of inpatient admission due to acute psychosis, after an extensive discussion , pt.'s daughter agreed with the plan. Patient recevied PRN Zyprexa x1 on 8/3. Patient received PRN Zyprexa x1 on 8/3.    Tried calling [employer] , unable to get .  Called Romanian interpretor: [id number],  was unable to understand patient's language as pt. speaks Sylheti Romanian , however pt. reported that she is not able to sleep    Family member present at bedside helped with translation as there was no Luqit Romanian  available. Patient reported that someone was trying to kill her yesterday,  stated that there is a bart who sometimes gives her good commands and sometimes gives her bad commands, yesterday bart was trying to kill her. Whenever she hears something she feels scared that someone is trying to hurt her and coming after her. Patient received PRN Zyprexa x1 on 8/20.    Tried calling [employer] , unable to get .  Called Occitan interpretor: [id number],  was unable to understand patient's language as pt. speaks Sylheti Occitan , however pt. reported that she is not able to sleep    Family member present at bedside helped with translation as there was no [employer]  available. Patient reported that someone was trying to kill her yesterday,  stated that there is a bart who sometimes gives her good commands and sometimes gives her bad commands, yesterday bart was trying to kill her. Whenever she hears something she feels scared that someone is trying to hurt her and coming after her.

## 2019-08-21 NOTE — PROGRESS NOTE ADULT - SUBJECTIVE AND OBJECTIVE BOX
Bob Wells (Kd) PGY-1  Pager: [phone]     Patient is a 64y old  Female who presents with a chief complaint of Headache, nausea, audio hallucinations (20 Aug 2019 09:34)      SUBJECTIVE / OVERNIGHT EVENTS:  No acute complaints over night.   as per grandson, no events overnight.        MEDICATIONS  (STANDING):  aspirin enteric coated 81 milliGRAM(s) Oral daily  clonazePAM  Tablet 1 milliGRAM(s) Oral two times a day  enoxaparin Injectable 40 milliGRAM(s) SubCutaneous daily  fenofibrate Tablet 145 milliGRAM(s) Oral daily  metoprolol tartrate 12.5 milliGRAM(s) Oral two times a day  pantoprazole    Tablet 40 milliGRAM(s) Oral before breakfast  polyethylene glycol 3350 17 Gram(s) Oral daily  risperiDONE   Tablet 0.5 milliGRAM(s) Oral daily  risperiDONE   Tablet 0.75 milliGRAM(s) Oral at bedtime    MEDICATIONS  (PRN):  acetaminophen   Tablet .. 650 milliGRAM(s) Oral every 6 hours PRN Mild Pain (1 - 3), Moderate Pain (4 - 6)  OLANZapine 2.5 milliGRAM(s) Oral every 6 hours PRN Agitation/psychosis          OBJECTIVE:  Vital Signs Last 24 Hrs  T(C): 36.4 (21 Aug 2019 06:02), Max: 36.8 (20 Aug 2019 21:07)  T(F): 97.5 (21 Aug 2019 06:02), Max: 98.2 (20 Aug 2019 21:07)  HR: 91 (21 Aug 2019 06:02) (90 - 103)  BP: 102/71 (21 Aug 2019 06:02) (102/71 - 115/82)  BP(mean): --  RR: 17 (21 Aug 2019 06:02) (16 - 17)  SpO2: 99% (21 Aug 2019 06:02) (97% - 100%)    PHYSICAL EXAM:  GENERAL: NAD  HEAD:  Atraumatic, Normocephalic  EYES: PERRLA, conjunctiva and sclera clear  NECK: Supple, No JVD  CHEST/LUNG: Clear to auscultation bilaterally; No wheeze  HEART: Regular rate and rhythm; No murmurs, rubs, or gallops  ABDOMEN: Soft, Nontender, Nondistended; Bowel sounds present  EXTREMITIES: No clubbing, cyanosis, or edema  NEURO: AOx3, CN's intact, moves all extremities   SKIN: No rashes or lesions    CAPILLARY BLOOD GLUCOSE        I&O's Summary            LABS:                        11.4   8.07  )-----------( 319      ( 20 Aug 2019 05:00 )             36.8     08-20    130<L>  |  95<L>  |  10  ----------------------------<  125<H>  3.9   |  20<L>  |  0.80    Ca    9.1      20 Aug 2019 05:00  Phos  4.4     08-20  Mg     1.9     08-20                  RADIOLOGY & ADDITIONAL TESTS:

## 2019-08-21 NOTE — PROGRESS NOTE BEHAVIORAL HEALTH - NSBHADMITCOUNSEL_PSY_A_CORE
client/family/caregiver education
importance of adherence to chosen treatment/client/family/caregiver education

## 2019-08-21 NOTE — PROGRESS NOTE BEHAVIORAL HEALTH - OTHER
anxious, minimally engaging, crying in bed impoverished delusional that someone is trying to kill her pt. crying, not engaging anxious, minimally engaging anxious, engaging more no stiffness or rigidity somewhat impoverished Stated that some times bart gives her good and bad commands, denies AH at the time of interview

## 2019-08-21 NOTE — PROVIDER CONTACT NOTE (OTHER) - ASSESSMENT
SW informed by team pt cleared for dc to [facility]. SW contacted central intake to obtain bed on [id number]. SW faxed legals to central intake. Pt to be transported by security. Team provided with contact numbers for verbal signout. No further sw needs at this time.

## 2019-08-21 NOTE — PROGRESS NOTE BEHAVIORAL HEALTH - LANGUAGE
Normal repetition/No abnormalities noted
No abnormalities noted/Normal repetition
No abnormalities noted/Normal repetition

## 2019-08-21 NOTE — DISCHARGE NOTE NURSING/CASE MANAGEMENT/SOCIAL WORK - NSDCPNINST_GEN_ALL_CORE
Mrs Murrell is alert and oriented x 2, confused and disoriented , with auditory Hallucinations.   She believes people are trying to kill her . She is out of bed with standby assists; her Skin is intact and all vital signs are stable.  Report is given to [facility] and she will be transferred Mrs Murrell is alert and oriented x 2, confused and disoriented , with auditory Hallucinations.   She believes people are trying to kill her . She is out of bed with standby assists; her Skin is intact and all vital signs are stable.  Report is given to [facility] and she will be transferred. Report is given to Karin Richmond RN. She is accompanied by PCA and Security.

## 2019-08-21 NOTE — PROGRESS NOTE BEHAVIORAL HEALTH - THOUGHT PROCESS
Perseverative/Other
Illogical/Impaired reasoning/Other/Perseverative
Perseverative/Illogical/Impaired reasoning/Other

## 2019-08-21 NOTE — DISCHARGE NOTE NURSING/CASE MANAGEMENT/SOCIAL WORK - NSDCDPATPORTLINK_GEN_ALL_CORE
You can access the [facility] Patient Portal, offered by [facility], by registering with the following website: [email or web address]

## 2019-08-21 NOTE — PROGRESS NOTE ADULT - PROBLEM SELECTOR PLAN 2
likely 2/2 to SIADH 2/2 to antispychotics vs anxiety/stress vs underlying schizo. worsened today (na 130)  -fluid restriction

## 2019-08-21 NOTE — PROGRESS NOTE ADULT - ASSESSMENT
64 yr old female with hx of depression, HTN and HLD presents w/ non-specific headache, nausea, & audio hallucinations. likely psychosis, r/o'ed ic mass vs lytes imbalance vs thyroid d/o.pending transfer to [facility] inpt psych.

## 2019-08-21 NOTE — PROGRESS NOTE ADULT - PROBLEM SELECTOR PLAN 1
Medical workup currently unremarkable (no infections (CXR, UA), no leukocytosis, no fever, thyroid studies wnl, ct head unremarkable  -Psych following, appreciate recs  -Hold Mirtazapine due to hyponatremia   -Increase Risperdal 0.5mg QAM and 0.75 mg hs,  if qtc <500  -Continue Klonopin 1mg bid.   -Olanzapine 2.5 mg q6hrs po/IM  prn for severe agitation if qtc <500  -psych recs appreciated

## 2019-08-21 NOTE — PROGRESS NOTE BEHAVIORAL HEALTH - NSBHCHARTREVIEWVS_PSY_A_CORE FT
ICU Vital Signs Last 24 Hrs  T(C): 36.7 (20 Aug 2019 14:07), Max: 36.7 (20 Aug 2019 14:07)  T(F): 98.1 (20 Aug 2019 14:07), Max: 98.1 (20 Aug 2019 14:07)  HR: 99 (20 Aug 2019 14:07) (84 - 110)  BP: 105/78 (20 Aug 2019 14:07) (94/68 - 107/78)  BP(mean): --  ABP: --  ABP(mean): --  RR: 17 (20 Aug 2019 14:07) (16 - 17)  SpO2: 97% (20 Aug 2019 14:07) (97% - 100%) ICU Vital Signs Last 24 Hrs  T(C): 36.4 (21 Aug 2019 06:02), Max: 36.8 (20 Aug 2019 21:07)  T(F): 97.5 (21 Aug 2019 06:02), Max: 98.2 (20 Aug 2019 21:07)  HR: 91 (21 Aug 2019 06:02) (90 - 103)  BP: 102/71 (21 Aug 2019 06:02) (102/71 - 115/82)  BP(mean): --  ABP: --  ABP(mean): --  RR: 17 (21 Aug 2019 06:02) (16 - 17)  SpO2: 99% (21 Aug 2019 06:02) (97% - 100%)

## 2019-08-21 NOTE — PROGRESS NOTE BEHAVIORAL HEALTH - RISK ASSESSMENT
Risk given; acute psychosis    She has protective factors, free of si/hi/i/p, no cah , has supportive family.      Pt. is acutely psychotic ad needs an inpatient admission.

## 2019-08-22 PROCEDURE — 93010 ELECTROCARDIOGRAM REPORT: CPT

## 2019-08-22 PROCEDURE — 99232 SBSQ HOSP IP/OBS MODERATE 35: CPT

## 2019-08-22 PROCEDURE — 99223 1ST HOSP IP/OBS HIGH 75: CPT

## 2019-08-22 RX ORDER — OLANZAPINE 15 MG/1
2.5 TABLET, FILM COATED ORAL ONCE
Refills: 0 | Status: COMPLETED | OUTPATIENT
Start: 2019-08-22 | End: 2019-08-22

## 2019-08-22 RX ORDER — RISPERIDONE 4 MG/1
1 TABLET ORAL AT BEDTIME
Refills: 0 | Status: DISCONTINUED | OUTPATIENT
Start: 2019-08-22 | End: 2019-08-26

## 2019-08-22 RX ADMIN — OLANZAPINE 2.5 MILLIGRAM(S): 15 TABLET, FILM COATED ORAL at 08:10

## 2019-08-22 RX ADMIN — RISPERIDONE 1 MILLIGRAM(S): 4 TABLET ORAL at 21:04

## 2019-08-22 RX ADMIN — Medication 12.5 MILLIGRAM(S): at 21:04

## 2019-08-22 RX ADMIN — Medication 1 MILLIGRAM(S): at 21:05

## 2019-08-22 NOTE — CONSULT NOTE ADULT - ASSESSMENT
64F gastritis, SIADH, HLD, HTN with psychosis    Plan:  SIADH: Mild chronic asymptomatic hyponatremia. Etiology unclear. Excessive fluid intake should be avoided.    Gastritis: Continue PPI    Headache and dizziness absent; tylenol prn    HTN: Continue metoprolol    HLD: continue fenofibrate    Psychosis: Management per primary team

## 2019-08-22 NOTE — CONSULT NOTE ADULT - SUBJECTIVE AND OBJECTIVE BOX
HPI: seen with staff member for Belarusian translation. Pt is 64F admitted to [facility] 8/19/19 with complaints of headache, dizziness, and auditory hallucinations.  She had an admissionm to [facility] July 2019 with EGD and found to have chronic gastritis and SIADHcausing mild chronic hyponatremia.  No acute medical problems were found on this admission and she was transferred to [facility] 8/21/19 fro further management fo psychosis.  With  she is a poor historian, often providing answers unrelated to questions.  She does deny headache or pain elsewhere.      PAST MEDICAL & SURGICAL HISTORY:  Depression  Hyperlipemia  Hypertension  Chronic gastritis  SIADH  No significant past surgical history      Review of Systems: limited ROS due to thought disorder, see HPI  Allergies    No Known Allergies    Intolerances        Social History: no hx etoh tob idu    FAMILY HISTORY:  No pertinent family history in first degree relatives      MEDICATIONS  (STANDING):  aspirin enteric coated 81 milliGRAM(s) Oral daily  clonazePAM  Tablet 1 milliGRAM(s) Oral two times a day  ergocalciferol 31523 Unit(s) Oral <User Schedule>  fenofibrate Tablet 145 milliGRAM(s) Oral daily  folic acid 1 milliGRAM(s) Oral daily  metoprolol tartrate 12.5 milliGRAM(s) Oral two times a day  multivitamin 1 Tablet(s) Oral daily  pantoprazole    Tablet 40 milliGRAM(s) Oral before breakfast  polyethylene glycol 3350 17 Gram(s) Oral daily  risperiDONE   Tablet 0.5 milliGRAM(s) Oral daily  risperiDONE   Tablet 1 milliGRAM(s) Oral at bedtime    MEDICATIONS  (PRN):  acetaminophen   Tablet .. 650 milliGRAM(s) Oral every 6 hours PRN Mild Pain (1 - 3), Moderate Pain (4 - 6), Severe Pain (7 - 10)  OLANZapine 2.5 milliGRAM(s) Oral every 6 hours PRN Agitation/psychosis  OLANZapine Injectable 2.5 milliGRAM(s) IntraMuscular once PRN agitation      Vital Signs Last 24 Hrs  T(C): 36.1 (22 Aug 2019 08:12), Max: 36.1 (22 Aug 2019 08:12)  T(F): 97 (22 Aug 2019 08:12), Max: 97 (22 Aug 2019 08:12)  HR: 95 (22 Aug 2019 08:30) (95 - 101)  BP: 104/68 (22 Aug 2019 08:30) (98/73 - 104/68)  BP(mean): --  RR: 100 (22 Aug 2019 08:30) (100 - 100)  SpO2: 100% (22 Aug 2019 08:00) (100% - 100%)  CAPILLARY BLOOD GLUCOSE            PHYSICAL EXAM:  GENERAL: NAD, well-developed  HEAD:  Atraumatic, Normocephalic  EYES: EOMI, conjunctiva and sclera clear  NECK: Supple, No JVD  CHEST/LUNG: Clear to auscultation bilaterally; No wheeze  HEART: Regular rate and rhythm; No murmurs, rubs, or gallops  ABDOMEN: Soft, Nontender, Nondistended; Bowel sounds present  EXTREMITIES:  2+ Peripheral Pulses, No clubbing, cyanosis, or edema  NEUROLOGY: non-focal  SKIN: No rashes or lesions    LABS:      Reviewed in sunrise              EKG(personally reviewed): NSR 90 normal EKG      Care Discussed with Consultants/Other Providers: Dr Nunez

## 2019-08-23 PROCEDURE — 99232 SBSQ HOSP IP/OBS MODERATE 35: CPT | Mod: GC

## 2019-08-23 RX ORDER — RISPERIDONE 4 MG/1
1 TABLET ORAL DAILY
Refills: 0 | Status: DISCONTINUED | OUTPATIENT
Start: 2019-08-24 | End: 2019-08-26

## 2019-08-23 RX ADMIN — RISPERIDONE 0.5 MILLIGRAM(S): 4 TABLET ORAL at 09:56

## 2019-08-23 RX ADMIN — Medication 145 MILLIGRAM(S): at 09:56

## 2019-08-23 RX ADMIN — Medication 1 MILLIGRAM(S): at 09:56

## 2019-08-23 RX ADMIN — Medication 1 MILLIGRAM(S): at 09:55

## 2019-08-23 RX ADMIN — Medication 1 TABLET(S): at 09:56

## 2019-08-23 RX ADMIN — Medication 12.5 MILLIGRAM(S): at 09:56

## 2019-08-23 RX ADMIN — RISPERIDONE 1 MILLIGRAM(S): 4 TABLET ORAL at 20:46

## 2019-08-23 RX ADMIN — Medication 12.5 MILLIGRAM(S): at 20:46

## 2019-08-23 RX ADMIN — PANTOPRAZOLE SODIUM 40 MILLIGRAM(S): 20 TABLET, DELAYED RELEASE ORAL at 09:56

## 2019-08-23 RX ADMIN — Medication 1 MILLIGRAM(S): at 20:46

## 2019-08-23 RX ADMIN — Medication 81 MILLIGRAM(S): at 09:55

## 2019-08-24 PROCEDURE — 99232 SBSQ HOSP IP/OBS MODERATE 35: CPT

## 2019-08-24 RX ADMIN — Medication 1 MILLIGRAM(S): at 19:44

## 2019-08-24 RX ADMIN — Medication 81 MILLIGRAM(S): at 09:49

## 2019-08-24 RX ADMIN — RISPERIDONE 1 MILLIGRAM(S): 4 TABLET ORAL at 19:45

## 2019-08-24 RX ADMIN — POLYETHYLENE GLYCOL 3350 17 GRAM(S): 17 POWDER, FOR SOLUTION ORAL at 09:50

## 2019-08-24 RX ADMIN — RISPERIDONE 1 MILLIGRAM(S): 4 TABLET ORAL at 09:50

## 2019-08-24 RX ADMIN — Medication 12.5 MILLIGRAM(S): at 19:44

## 2019-08-24 RX ADMIN — PANTOPRAZOLE SODIUM 40 MILLIGRAM(S): 20 TABLET, DELAYED RELEASE ORAL at 07:36

## 2019-08-24 RX ADMIN — Medication 1 MILLIGRAM(S): at 09:49

## 2019-08-25 PROCEDURE — 99232 SBSQ HOSP IP/OBS MODERATE 35: CPT

## 2019-08-25 RX ADMIN — Medication 145 MILLIGRAM(S): at 08:37

## 2019-08-25 RX ADMIN — RISPERIDONE 1 MILLIGRAM(S): 4 TABLET ORAL at 08:37

## 2019-08-25 RX ADMIN — Medication 1 MILLIGRAM(S): at 08:37

## 2019-08-25 RX ADMIN — Medication 81 MILLIGRAM(S): at 08:37

## 2019-08-25 RX ADMIN — Medication 12.5 MILLIGRAM(S): at 08:37

## 2019-08-25 RX ADMIN — RISPERIDONE 1 MILLIGRAM(S): 4 TABLET ORAL at 20:27

## 2019-08-25 RX ADMIN — PANTOPRAZOLE SODIUM 40 MILLIGRAM(S): 20 TABLET, DELAYED RELEASE ORAL at 08:37

## 2019-08-25 RX ADMIN — Medication 12.5 MILLIGRAM(S): at 20:27

## 2019-08-25 RX ADMIN — Medication 1 MILLIGRAM(S): at 20:27

## 2019-08-25 RX ADMIN — Medication 1 TABLET(S): at 08:37

## 2019-08-26 VITALS — TEMPERATURE: 98 F | RESPIRATION RATE: 18 BRPM

## 2019-08-26 PROCEDURE — 99238 HOSP IP/OBS DSCHRG MGMT 30/<: CPT

## 2019-08-26 RX ORDER — CLONAZEPAM 1 MG
1 TABLET ORAL
Qty: 28 | Refills: 0
Start: 2019-08-26 | End: 2019-09-08

## 2019-08-26 RX ORDER — PANTOPRAZOLE SODIUM 20 MG/1
1 TABLET, DELAYED RELEASE ORAL
Qty: 14 | Refills: 0
Start: 2019-08-26 | End: 2019-09-08

## 2019-08-26 RX ORDER — FOLIC ACID 0.8 MG
1 TABLET ORAL
Qty: 0 | Refills: 0 | DISCHARGE

## 2019-08-26 RX ORDER — ASPIRIN/CALCIUM CARB/MAGNESIUM 324 MG
1 TABLET ORAL
Qty: 14 | Refills: 0
Start: 2019-08-26 | End: 2019-09-08

## 2019-08-26 RX ORDER — FENOFIBRATE,MICRONIZED 130 MG
1 CAPSULE ORAL
Qty: 14 | Refills: 0
Start: 2019-08-26 | End: 2019-09-08

## 2019-08-26 RX ORDER — FENOFIBRATE,MICRONIZED 130 MG
1 CAPSULE ORAL
Qty: 0 | Refills: 0 | DISCHARGE

## 2019-08-26 RX ORDER — NEBIVOLOL HYDROCHLORIDE 5 MG/1
1 TABLET ORAL
Qty: 0 | Refills: 0 | DISCHARGE

## 2019-08-26 RX ORDER — ASPIRIN/CALCIUM CARB/MAGNESIUM 324 MG
1 TABLET ORAL
Qty: 0 | Refills: 0 | DISCHARGE

## 2019-08-26 RX ORDER — FOLIC ACID 0.8 MG
1 TABLET ORAL
Qty: 0 | Refills: 0 | DISCHARGE
Start: 2019-08-26

## 2019-08-26 RX ORDER — RISPERIDONE 4 MG/1
1 TABLET ORAL
Qty: 28 | Refills: 0
Start: 2019-08-26 | End: 2019-09-08

## 2019-08-26 RX ORDER — METOPROLOL TARTRATE 50 MG
0.5 TABLET ORAL
Qty: 14 | Refills: 0
Start: 2019-08-26 | End: 2019-09-08

## 2019-08-26 RX ADMIN — Medication 81 MILLIGRAM(S): at 08:55

## 2019-08-26 RX ADMIN — Medication 1 MILLIGRAM(S): at 08:55

## 2019-08-26 RX ADMIN — Medication 12.5 MILLIGRAM(S): at 08:56

## 2019-08-26 RX ADMIN — PANTOPRAZOLE SODIUM 40 MILLIGRAM(S): 20 TABLET, DELAYED RELEASE ORAL at 06:22

## 2019-08-26 RX ADMIN — Medication 1 TABLET(S): at 08:56

## 2019-08-26 RX ADMIN — Medication 145 MILLIGRAM(S): at 08:56

## 2019-08-26 RX ADMIN — Medication 1 MILLIGRAM(S): at 08:56

## 2019-08-26 RX ADMIN — RISPERIDONE 1 MILLIGRAM(S): 4 TABLET ORAL at 08:56

## 2019-09-01 ENCOUNTER — OUTPATIENT (OUTPATIENT)
Dept: OUTPATIENT SERVICES | Facility: HOSPITAL | Age: 64
LOS: 1 days | End: 2019-09-01
Payer: MEDICAID

## 2019-09-01 PROCEDURE — G9001: CPT

## 2019-09-11 DIAGNOSIS — Z71.89 OTHER SPECIFIED COUNSELING: ICD-10-CM

## 2020-12-05 NOTE — H&P ADULT - HISTORY OF PRESENT ILLNESS
Report rec'd from STEPHANIE Light at [id number] change of shift during bedside rounds. Pt attempting to get OOB constantly. Personal alarm on and intact. Has set off alarm x3 already in past hour. Denies needing to urinate or have BM. Clean and dry at this time. NAD, VSS. Bed in lowest position, call bell within reach, side rails up x2, floor mats in place. 65 y/o F with hx of HTN, HLD, hyponatremia presents with abdominal discomfort which describes as burning/bloating sensation with associated nausea x 1 week. The symptoms are intermittent, but can be intense at times. Also complains of L sided chest discomfort, non radiating x 1 week. She states she also gets dizzy and SOB when the abdominal discomfort is terrible. Denies fever, cough, falls, LOC, vomiting, diarrhea, constipation, melena, hematochezia, Le edema or dysuria.     Of note: patient presented [facility] x 1 weeks for similar complaints, but was discharged home. She was then admitted last Wednesday and found to have hyponatremia. She was dc'ed on Friday and returned to ED 65 y/o F with hx of HTN, HLD, hyponatremia presents with abdominal discomfort which describes as burning/bloating sensation with associated nausea x 1 week. The symptoms are intermittent, but can be intense at times. Also complains of L sided chest discomfort, non radiating x 1 week. She states she also gets dizzy and SOB when the abdominal discomfort is terrible. Denies fever, cough, falls, LOC, PINON, vomiting, diarrhea, constipation, melena, hematochezia, Le edema or dysuria.     Of note: patient presented [facility] x 1 weeks for similar complaints, but was discharged home. She was then admitted last Wednesday and found to have hyponatremia. She was dc'ed on Friday and returned to ED 63 y/o Female with hx of HTN, HLD, hyponatremia presents with abdominal discomfort which describes as burning/bloating sensation with associated nausea x 1 week. The symptoms are intermittent, but can be intense at times. Also complains of L sided chest discomfort, non radiating x 1 week. She states she also gets dizzy and SOB when the abdominal discomfort is terrible. Denies fever, cough, falls, LOC, PINON, vomiting, diarrhea, constipation, melena, hematochezia, Le edema or dysuria.     Of note: patient presented [facility] x 1 weeks for similar complaints, but was discharged home. She was then admitted last Wednesday and found to have hyponatremia. She was dc'ed on Friday and returned to ED 65 y/o Female with hx of HTN, HLD, hyponatremia presents with abdominal discomfort which describes as burning/bloating sensation with associated nausea and no vomiting x 1 week. The symptoms are intermittent, but can be intense at times and also associated with midsternal chest discomfort, non radiating x 1 week. Chest pain occurs with food. She states she also gets dizzy and get SOB when the abdominal discomfort is terrible. Denies fever, cough, falls, LOC, PINON, vomiting, diarrhea, constipation, melena, hematochezia, LE edema or dysuria. Of note, the pt has difficulty providing specifics and details of her symptoms;     Of note: patient presented [facility] x 1 weeks for similar complaints, but was discharged home. She was then admitted last Wednesday and found to have hyponatremia. She was dc'ed on Friday and returned to ED

## 2021-05-25 NOTE — ED BEHAVIORAL HEALTH ASSESSMENT NOTE - NS ED BHA PLAN ADMIT TO MSU REFERRANT CONTACTED YN
Patient  states Dr. Rosie Irby had given nausea medications in the past.  They don't have the name or the bottle. States patient is having nausea off and on and would like RX sent to [facility] on [address].
Spoke to patient. She reports that she has had nausea at times and did not fill her last zofran rx in time. New rx sent to pharmacy. Patient states that she is able to drink fluids. Instructed to call if she has increased nausea or feels that she is dehydrated. Patient verbalized understanding. Will inform Dr Mo Multani.
Yes

## 2022-05-05 NOTE — ED BEHAVIORAL HEALTH ASSESSMENT NOTE - PATIENT SEEN BY
Plan: Patient to use tinted sunscreens and discussed [employer] supplements Detail Level: Zone Attending Psychiatrist without NP/Trainee

## 2023-01-12 NOTE — PROGRESS NOTE ADULT - PROBLEM SELECTOR PLAN 6
CT head not contributory: There is no acute intracranial hemorrhage, mass effect, midline shift or extra axial collections.   Check orthostatics x3 checks  Fall risk  echo ordered  cont meclizine  Telemetry  PT eval to assess gait stability, fall precautions for now Azithromycin Pregnancy And Lactation Text: This medication is considered safe during pregnancy and is also secreted in breast milk.

## 2023-02-27 NOTE — H&P ADULT - NS ABD PE RECTAL EXAM
[FreeTextEntry1] : This is a 89 year year old female here today for follow up cardiac evaluation. \par She has a past medical history significant for hypertension, mitral valve regurgitation, occasional palpitations and occasional ankle edema.\par \par CHIEF COMPLAINT:\par Today she is feeling generally well and does not have any complaints at this time.  \par \par She is currently prescribed amlodipine 5 mg daily, losartan 100 mg daily, metoprolol succinate 50 mg 1 tablet daily, potassium chloride 10 mEq 1 tablet daily and rosuvastatin 10 mg 1 tablet daily.  However she states that she is no longer on metoprolol and is now on propanolol 120 mg daily which was changed by her primary care doctor.\par \par PMH:\par She is here with her son to help translate and he states that her primary care doctor took her off propanolol and placed her on metoprolol succinate because propanolol has a side effect of depression.  The patient's son says that she is never depressed and has been on propanolol for "for years.  He states that she was taking her blood pressure at home from her home electronic cuff and were getting elevated readings and is now back on propanolol herself.  He is concerned because she feels that her blood pressure is elevated and she has not been seen in our office since 2020.\par \par She denies fever, chills, weight loss, malaise, rash, alteration bowel habits, weakness, abdominal  pain, bloating, changes in urination, visual disturbances, chest pain, headaches, dizziness, heart palpitations, recent episodes of syncope or falls at this time.\par \par She was born in [place] and has no history of rheumatic fever.  She does not drink excessive caffeine or alcohol. \par \par BLOOD PRESSURE:\par -BP is well controlled in today's visit.  \par \par -I have discussed the importance of maintaining good BP control and reviewed the newest guidelines with the patient while re-enforcing dietary sodium restrictions to no more than 2-3 g daily, DASH diet, life style modifications as well as the goal of maintaining ideal body weight with the patient today. I have advised the patient to avoid the use of over-the-counter medications/ supplements especially NSAIDS.\par \par I have reviewed with MsTejinder CHRISTINE that serious health consequences can occur when blood pressure is not well controlled and the need for strict compliance with medication and that optimal control can significantly reduce the risk of cardiovascular disease stroke, heart attack and other organ damage. They verbally expressed understanding of the fore mentioned serious health consequences to me today.\par \par BLOOD WORK:\par -New blood work was done 12/9/2022 which demonstrated values WDL A1c 5.8%.\par \par CHOLESTEROL CONTROL:\par -Patient will continue the advised  TLC diet and to continue follow-up for treatment of hyperlipidemia and repeat blood testing with diet and exercise. I have discussed different exercises and the importance of maintenance of optimal body weight. The importance of staying within guidelines and recommendations was stressed to the patient today and they acknowledged that they understand this to me verbally.\par \par  -Ms. KING was educated and advised that failure to follow-up with my medical recommendations to lower cholesterol can result in severe health consequences therefore, they will continuing a low saturated and low fat diet and to avoid excessive carbohydrates to help reduce triglycerides and that lowering LDL levels is associated with a significant decrease in serious cardiac events including but not limited to heart attack stroke and overall death. We will continue lipid lowering agents as advised based on blood test results and the patient understands to call if they develop severe muscle discomfort or if they have a reddish tinted discoloration in their urine.\par \par EDEMA:\par Patient to advised to follow-up if any redness or signs of infection develop on their legs.\par Patient had an educational review of the treatment of leg edema. Patient was advised to keep their legs elevated and take medication prescribed today and avoid sodium containing foods. The patient was advised to call prescribe medications and to notify the office of his condition worsens or they developed shortness of breath.\par \par TESTING/REPORTS:\par -Echocardiogram done in the office 2/27/2023 and results are pending.\par \par -EKG done Dec 16, 2022 which demonstrated regular sinus rhythm with nonspecific ST-T wave changes, BPM of  56 degree AV block.  This is her baseline rhythm.\par \par -The patient had a nuclear stress test February 19, 2020 which demonstrated fixed defects without evidence for ischemia.  \par \par -She also had a Holter monitor done which revealed no significant arrhythmia or heart block.  \par \par -She has normal left ventricular function.  \par \par Electrocardiogram done January 29, 2020 demonstrated normal sinus rhythm rate 60 beats per minutes otherwise markable for left ventricular hypertrophy, left axis deviation, and T-wave inversion in V1 and V2.\par \par PLAN:\par -She will continue with her current medications and will contact the office if she is having any complaints between now and their next follow up appointment.\par \par I have discussed the plan of care with Ms. BRITTNEY KING  and she  will follow up in 3 months. She is compliant with all of her medications.\par \par The patient understands that aerobic exercises must be increased to minutes 4 times/week and a detailed discussion of lifestyle modification was done today. \par The patient has a good understanding of the diagnosis, treatment plan and lifestyle modification. \par She will contact me at the office for any questions with their care or any changes in their health status.\par \par \par Shantel MOE  not examined

## 2023-03-14 NOTE — PROGRESS NOTE BEHAVIORAL HEALTH - EYE CONTACT
Poor Fair Melolabial Transposition Flap Text: The defect edges were debeveled with a #15 scalpel blade.  Given the location of the defect and the proximity to free margins a melolabial flap was deemed most appropriate.  Using a sterile surgical marker, an appropriate melolabial transposition flap was drawn incorporating the defect.    The area thus outlined was incised deep to adipose tissue with a #15 scalpel blade.  The skin margins were undermined to an appropriate distance in all directions utilizing iris scissors.

## 2023-08-17 NOTE — BEHAVIORAL HEALTH ASSESSMENT NOTE - NS ED [facility] MED ROS GENITOURINARY
Patient ID: Elizabeth Cox : 1969 MRN: [id number]    History of Present Illness  Elizabeth Cox is a 54 year old female doing virtual visit    -here for  -COVID  -symptoms started   -positive covid on  with home test  -still having headache, nausea, congestion, fevers up to 102-103, chills, night sweats  -slow improvement, feels slightly stronger today  -taking vitamin C, zinc, tylenol  -on day 2 of paxlovid  -lives with self  -works as   -needs work note  -also needs refill for amlodipine  -last saw PCP in 2022, told to f/u in 3 months    Vitals - Patient Reported  Temp - Patient Reported: 99.7 °F (37.6 °C)  Temp Source: Temporal   The physical exam below was done via video during virtual visit  General: no acute distress, sitting comfortably  HEENT: normocephalic atraumatic  Respiratory: no conversational dyspnea, able to speak in full sentences  Neurological: awake and alert  Skin: skin visualized was warm and dry      Assessment and Plan  Viral URI  (primary encounter diagnosis)  Plan: amLODIPine (NORVASC) 10 MG tablet    Essential hypertension  Plan: amLODIPine (NORVASC) 10 MG tablet  -acute illness with systemic symptoms  -supportive care, rest and hydration, complete 5 days of Paxlovid    Follow up with primary care provider or sooner as needed    No LOS data to display  (including reviewing the patient's chart, obtaining history, performing an exam, counseling the patient, coordinating care, and documenting clinical information in the EMR)     We discussed the risks and benefits of any medications started or modified as well as significant and common adverse effects to monitor as appropriate to problems addressed at today's visit.    In addition to the above, as medically indicated and appropriate to problems addressed at today's visit, we discussed at length anticipatory guidance and indications to return to clinic for earlier appointment, go to urgent care, go to the nearest  emergency room, and/or call 911.    Prior to discharge all questions and concerns were addressed.  Patient expressed understanding of the working diagnoses, assessments, plan of care.  Plan was made with patient with respect to patient autonomy using a shared decision making model.  Lasha Franklin DO, MA Bioethics    The patient has consented to a virtual encounter for medical management.   The billing/attending physician is completing the encounter on the scheduled date from [facility] [address]   The patient reports completing the encounter from their home in [place].           No complaints

## 2024-04-15 NOTE — PROGRESS NOTE BEHAVIORAL HEALTH - NS ED [facility] MED ROS RESPIRATORY
----- Message from GITA White sent at 4/14/2024 10:06 PM EDT -----  Biopsies are consistent with duodenitis (inflammation of the first portion of the small bowel)  Recommend PPI, sent following EGD.  Place in recall for repeat colonoscopy in 5 years.  Keep f/u.  
Patient notified of results and recommendations and verbalized understanding, patient placed in recall system, letter mailed to patient and pcp.  
No complaints

## 2025-01-09 NOTE — PROGRESS NOTE ADULT - PROBLEM SELECTOR PROBLEM 1
Discharge instructions given.  Patient verbalized understanding.  Return to [facility] in 3 week(s).  Called/faxed orders to  [facility].      
Abdominal pain

## 2025-07-16 NOTE — ED ADULT TRIAGE NOTE - AS O2 DELIVERY
ENDOCRINE CONSULT PROGRESS NOTE  DATE OF SERVICE: 25        PATIENT NAME: Lisa Jay  PATIENT : 1968 AGE: 57 y.o.  MRN NUMBER: [id number]    ==========================================================================    CHIEF COMPLAINT: Concerns for adrenal insufficiency    CARE TEAM:   Patient Care Team:  Brian Gonzalez MD as PCP - General (Family Medicine)    SUBJECTIVE    Pt seen and examined.  Tolerating meals.  Known to endocrine service from previous hospitalization.    ==========================================================================    CURRENT ACTIVE HOSPITAL MEDICATIONS    Scheduled Medications:  atorvastatin, 10 mg, Oral, Q PM  budesonide-formoterol, 2 puff, Inhalation, BID - RT  clopidogrel, 75 mg, Oral, Daily  [START ON 2025] cosyntropin, 0.25 mg, Intravenous, Once  gabapentin, 800 mg, Oral, Q8H  hydroxychloroquine, 400 mg, Oral, QAM  Lidocaine, 1 patch, Transdermal, Q24H  metoprolol tartrate, 25 mg, Oral, BID  oxyCODONE, 15 mg, Oral, Q4H  sodium chloride, 10 mL, Intravenous, Q12H  venlafaxine XR, 37.5 mg, Oral, Daily         PRN Medications:    albuterol    aluminum-magnesium hydroxide-simethicone    senna-docusate sodium **AND** polyethylene glycol **AND** bisacodyl **AND** bisacodyl    Calcium Replacement - Follow Nurse / BPA Driven Protocol    cetirizine    cyclobenzaprine    dextrose    dextrose    glucagon (human recombinant)    Magnesium Standard Dose Replacement - Follow Nurse / BPA Driven Protocol    melatonin    nitroglycerin    ondansetron ODT **OR** ondansetron    Phosphorus Replacement - Follow Nurse / BPA Driven Protocol    Potassium Replacement - Follow Nurse / BPA Driven Protocol    [COMPLETED] Insert Peripheral IV **AND** sodium chloride    sodium chloride    sodium chloride     ==========================================================================    OBJECTIVE    Vitals:    25 1607   BP: 113/81   Pulse: 74   Resp: 17   Temp: 97.4 °F (36.3 °C)    SpO2: 96%      Body mass index is 38.89 kg/m².     General - A&Ox3, NAD, Calm    ==========================================================================    LAB EVALUATION    Lab Results   Component Value Date    GLUCOSE 99 07/16/2025    BUN 17.6 07/16/2025    CREATININE 0.85 07/16/2025    EGFRIFAFRI >60 11/02/2022    BCR 20.7 07/16/2025    K 4.1 07/16/2025    K 4.1 07/16/2025    CO2 25.2 07/16/2025    CALCIUM 9.3 07/16/2025    ALBUMIN 4.4 07/13/2025    LABIL2 1.1 11/02/2022    AST 29 07/13/2025    ALT 23 07/13/2025       Lab Results   Component Value Date    HGBA1C 5.77 (H) 07/14/2025    HGBA1C 5.72 (H) 03/31/2025    HGBA1C 5.9 (H) 07/10/2024     Lab Results   Component Value Date    CREATININE 0.85 07/16/2025     Results from last 7 days   Lab Units 07/16/25  1133 07/16/25  0806 07/16/25  0401 07/15/25  2352 07/15/25  1854 07/15/25  1633   GLUCOSE mg/dL 98 110* 93 99 104 134*     ==========================================================================    ASSESSMENT AND PLAN    # Low cortisol levels  - Patient had ACTH stimulation test earlier this morning but it was not done properly  - Will benefit from repeat ACTH stimulation test to be done tomorrow morning, orders in place    # Hyperglycemia with history of type 2 diabetes currently being managed with Mounjaro therapy and significant amount of weight lost over 1-1/2-year with history of reactive hypoglycemia  - Counseled patient to continue maintaining high-protein low-carb diet    Will follow with you.  Rest as per primary team.    Part of this note may be an electronic transcription/translation of spoken language to printed text using the Dragon Dictation System.     Note: Portions of this note may have been copied from previous notes but documentation have been reviewed and edited as necessary to support clinical decision making for today's visit.  The time of this note does not reflect the time I saw the patient but the time that this note was  written.    ==========================================================================  Mauricio Duncan MD  Department of Endocrine, Diabetes and Metabolism  [facility], [place]  ==========================================================================     room air